# Patient Record
Sex: MALE | Race: WHITE | Employment: OTHER | URBAN - METROPOLITAN AREA
[De-identification: names, ages, dates, MRNs, and addresses within clinical notes are randomized per-mention and may not be internally consistent; named-entity substitution may affect disease eponyms.]

---

## 2017-01-04 ENCOUNTER — OFFICE VISIT (OUTPATIENT)
Dept: CARDIOLOGY CLINIC | Age: 77
End: 2017-01-04

## 2017-01-04 VITALS
SYSTOLIC BLOOD PRESSURE: 102 MMHG | OXYGEN SATURATION: 99 % | HEIGHT: 69 IN | HEART RATE: 68 BPM | DIASTOLIC BLOOD PRESSURE: 68 MMHG | WEIGHT: 194 LBS | BODY MASS INDEX: 28.73 KG/M2

## 2017-01-04 DIAGNOSIS — I48.0 PAF (PAROXYSMAL ATRIAL FIBRILLATION) (HCC): Primary | ICD-10-CM

## 2017-01-04 DIAGNOSIS — I10 ESSENTIAL HYPERTENSION: ICD-10-CM

## 2017-01-04 PROCEDURE — 99214 OFFICE O/P EST MOD 30 MIN: CPT | Performed by: INTERNAL MEDICINE

## 2017-01-04 PROCEDURE — 93000 ELECTROCARDIOGRAM COMPLETE: CPT | Performed by: INTERNAL MEDICINE

## 2017-01-04 RX ORDER — AMIODARONE HYDROCHLORIDE 200 MG/1
200 TABLET ORAL DAILY
Qty: 30 TABLET | Refills: 5 | Status: SHIPPED | OUTPATIENT
Start: 2017-01-04 | End: 2017-07-17 | Stop reason: SDUPTHER

## 2017-01-04 RX ORDER — POTASSIUM CHLORIDE 750 MG/1
10 TABLET, FILM COATED, EXTENDED RELEASE ORAL DAILY
Qty: 60 TABLET | Refills: 5 | Status: ON HOLD | OUTPATIENT
Start: 2017-01-04 | End: 2019-09-28

## 2017-01-04 RX ORDER — ATORVASTATIN CALCIUM 10 MG/1
10 TABLET, FILM COATED ORAL DAILY
Qty: 30 TABLET | Refills: 5 | Status: SHIPPED | OUTPATIENT
Start: 2017-01-04

## 2017-01-04 RX ORDER — LISINOPRIL AND HYDROCHLOROTHIAZIDE 12.5; 1 MG/1; MG/1
1 TABLET ORAL DAILY
Qty: 30 TABLET | Refills: 5 | Status: ON HOLD | OUTPATIENT
Start: 2017-01-04 | End: 2019-09-28 | Stop reason: HOSPADM

## 2017-07-18 RX ORDER — AMIODARONE HYDROCHLORIDE 200 MG/1
TABLET ORAL
Qty: 30 TABLET | Refills: 6 | Status: SHIPPED | OUTPATIENT
Start: 2017-07-18 | End: 2018-03-19 | Stop reason: SDUPTHER

## 2017-07-19 ENCOUNTER — OFFICE VISIT (OUTPATIENT)
Dept: CARDIOLOGY CLINIC | Age: 77
End: 2017-07-19

## 2017-07-19 VITALS
DIASTOLIC BLOOD PRESSURE: 56 MMHG | WEIGHT: 193 LBS | SYSTOLIC BLOOD PRESSURE: 116 MMHG | HEIGHT: 70 IN | BODY MASS INDEX: 27.63 KG/M2 | HEART RATE: 68 BPM | OXYGEN SATURATION: 97 %

## 2017-07-19 DIAGNOSIS — I48.91 ATRIAL FIBRILLATION, UNSPECIFIED TYPE (HCC): Primary | ICD-10-CM

## 2017-07-19 PROCEDURE — 99214 OFFICE O/P EST MOD 30 MIN: CPT | Performed by: INTERNAL MEDICINE

## 2017-07-19 PROCEDURE — 1036F TOBACCO NON-USER: CPT | Performed by: INTERNAL MEDICINE

## 2017-07-19 PROCEDURE — 4040F PNEUMOC VAC/ADMIN/RCVD: CPT | Performed by: INTERNAL MEDICINE

## 2017-07-19 PROCEDURE — G8419 CALC BMI OUT NRM PARAM NOF/U: HCPCS | Performed by: INTERNAL MEDICINE

## 2017-07-19 PROCEDURE — 1123F ACP DISCUSS/DSCN MKR DOCD: CPT | Performed by: INTERNAL MEDICINE

## 2017-07-19 PROCEDURE — 93000 ELECTROCARDIOGRAM COMPLETE: CPT | Performed by: INTERNAL MEDICINE

## 2017-07-19 PROCEDURE — G8427 DOCREV CUR MEDS BY ELIG CLIN: HCPCS | Performed by: INTERNAL MEDICINE

## 2018-01-25 RX ORDER — RIVAROXABAN 20 MG/1
TABLET, FILM COATED ORAL
Qty: 30 TABLET | Refills: 4 | Status: SHIPPED | OUTPATIENT
Start: 2018-01-25 | End: 2018-03-19 | Stop reason: SDUPTHER

## 2018-03-19 ENCOUNTER — OFFICE VISIT (OUTPATIENT)
Dept: CARDIOLOGY CLINIC | Age: 78
End: 2018-03-19

## 2018-03-19 VITALS
HEART RATE: 74 BPM | OXYGEN SATURATION: 98 % | SYSTOLIC BLOOD PRESSURE: 122 MMHG | HEIGHT: 69 IN | BODY MASS INDEX: 27.4 KG/M2 | WEIGHT: 185 LBS | DIASTOLIC BLOOD PRESSURE: 68 MMHG

## 2018-03-19 DIAGNOSIS — I10 ESSENTIAL HYPERTENSION: ICD-10-CM

## 2018-03-19 DIAGNOSIS — I48.91 ATRIAL FIBRILLATION, UNSPECIFIED TYPE (HCC): Primary | ICD-10-CM

## 2018-03-19 PROCEDURE — 93000 ELECTROCARDIOGRAM COMPLETE: CPT | Performed by: INTERNAL MEDICINE

## 2018-03-19 PROCEDURE — 99214 OFFICE O/P EST MOD 30 MIN: CPT | Performed by: INTERNAL MEDICINE

## 2018-03-19 RX ORDER — AMIODARONE HYDROCHLORIDE 200 MG/1
TABLET ORAL
Qty: 90 TABLET | Refills: 5 | Status: SHIPPED | OUTPATIENT
Start: 2018-03-19 | End: 2018-03-19 | Stop reason: SDUPTHER

## 2018-03-19 RX ORDER — AMIODARONE HYDROCHLORIDE 200 MG/1
200 TABLET ORAL
Qty: 90 TABLET | Refills: 5 | Status: SHIPPED | OUTPATIENT
Start: 2018-03-19 | End: 2018-10-24 | Stop reason: ALTCHOICE

## 2018-03-19 NOTE — PROGRESS NOTES
[address]  Cardiology Consult Note      Lima Hughes  1940, 68 y.o. Chief Complaint   Patient presents with    Atrial Fibrillation     HTN - 6 month follow up    Other     no cardiac complaints    Medication Refill     orders pending for Xarelto and amiodarone                 LYSSA DANIELS:      HPI:   This is a 68 y.o. male with a history of HTN and AF. Previously seen at [facility], [place] 11/22/16 with c/o \"heart fluttering. \"  He was found to be in AFIB with RVR upon arrival to ED. Converted himself without intervention. Sent home on Xarelto. Is now on amiodarone (started 7/2017). Patient is here today for f/u of AFIB and HTN. Has no complaints. Doing well.      Past Medical History:   Diagnosis Date    Atrial fibrillation ([address]     Back pain     [facility] ([facility] [facility]     prostate    Hyperlipidemia     Hypertension       Past Surgical History:   Procedure Laterality Date    BACK SURGERY      COLONOSCOPY  12/30/2016    Dr Angelic Owen        Family History   Problem Relation Age of Onset    Colon Cancer Brother       Social History   Substance Use Topics    Smoking status: Former Smoker     Years: 12.00     Quit date: 1986    Smokeless tobacco: Never Used    Alcohol use No     No Known Allergies      Review of Systems -   Constitutional: Negative for weight gain/loss; malaise, fever  Respiratory: Negative for Asthma;  cough and hemoptysis  Cardiovascular: Negative for palpitations,dizziness   Gastrointestinal: Negative for abd.pain; constipation/diarrhea;    Genitourinary: Negative for stones; hematuria; frequency hesitancy  Integumentt: Negative for rash or pruritis  Hematologic/lymphatic: Negative for blood dyscrasia; leukemia/lymphoma  Musculoskeletal: Negative for Connective tissue disease  Neurological:  Negative for Seizure   Behavioral/Psych:Negative for Bipolar disorder, Schizophrenia; Dementia  Endocrine: negative for thyroid, parathyroid disease    Physical Examination:    /68 (Site: Left Arm, Position: Sitting)   Pulse 74   Ht 5' 9\" (1.753 m)   Wt 185 lb (83.9 kg)   SpO2 98%   BMI 27.32 kg/m²    HEENT:  Face: Atraumatic, Conjunctiva: Pink; non icteric,  Mucous Memb:  Moist, No thyromegaly or Lymphadenopathy  Respiratory:  Resp Assessment: normal, Resp Auscultation: clear  Cardiovascular: Auscultation: nl S1 & S2, Palpation:  Nl PMI; No heaves or thrills, JVP:  normal  Abdomen: Soft, non-tender, Normal bowel sounds,  No organomegaly  Extremities: No Cyanosis or Clubbing  Neurological: Oriented to time, place, and person, Non-anxious  Psychiatric: Normal mood and affect  Skin: Warm and dry,  No rash seen     Outpatient Prescriptions Marked as Taking for the 3/19/18 encounter (Office Visit) with Srikanth Kuo MD   Medication Sig Dispense Refill    XARELTO 20 MG TABS tablet TAKE ONE TABLET BY MOUTH DAILY 30 tablet 4    amiodarone (CORDARONE) 200 MG tablet TAKE ONE TABLET BY MOUTH DAILY 30 tablet 6    lisinopril-hydrochlorothiazide (PRINZIDE;ZESTORETIC) 10-12.5 MG per tablet Take 1 tablet by mouth daily 30 tablet 5    atorvastatin (LIPITOR) 10 MG tablet Take 1 tablet by mouth daily 30 tablet 5    potassium chloride (KLOR-CON 10) 10 MEQ extended release tablet Take 1 tablet by mouth daily 60 tablet 5    vitamin D (ERGOCALCIFEROL) 00417 UNITS CAPS capsule Take 50,000 Units by mouth once a week      PARoxetine (PAXIL) 40 MG tablet Take 40 mg by mouth every morning      Oxycodone-Acetaminophen (PERCOCET PO) Take by mouth      Cholecalciferol (VITAMIN D PO) Take by mouth         EKG:   3/19/18 SR        ASSESSMENT AND PLAN:      Paroxysmal Atrial Fibrillation  In SR on Amio and xarelto  No balance issues, No shortness of breath. Will reduce Amiodarone to 5 days a week. Hypertension  BP is controlled. Continue risk factor modification including Prinzide and Low Na diet. Follow up in 6months.         Thank you very much for

## 2018-07-31 NOTE — TELEPHONE ENCOUNTER
PA started via Covermymeds. Xarelto not covered. Please advise if will Rx alternative Eliquis 5 mg. Pt last seen Dr. Dianna Menard on 03/19/18. history of HTN and AF. Previously seen at [facility] [place] 11/22/16 with c/o \"heart fluttering. \"     ASSESSMENT AND PLAN:   Paroxysmal Atrial Fibrillation  In SR on Amio and xarelto  No balance issues, No shortness of breath.   Will reduce Amiodarone to 5 days a week.

## 2018-09-19 ENCOUNTER — OFFICE VISIT (OUTPATIENT)
Dept: CARDIOLOGY CLINIC | Age: 78
End: 2018-09-19

## 2018-09-19 VITALS
WEIGHT: 184 LBS | HEART RATE: 69 BPM | SYSTOLIC BLOOD PRESSURE: 136 MMHG | DIASTOLIC BLOOD PRESSURE: 80 MMHG | OXYGEN SATURATION: 98 % | BODY MASS INDEX: 27.25 KG/M2 | HEIGHT: 69 IN

## 2018-09-19 DIAGNOSIS — I48.0 PAROXYSMAL ATRIAL FIBRILLATION (HCC): Primary | ICD-10-CM

## 2018-09-19 DIAGNOSIS — I10 ESSENTIAL HYPERTENSION: ICD-10-CM

## 2018-09-19 PROCEDURE — 99214 OFFICE O/P EST MOD 30 MIN: CPT | Performed by: INTERNAL MEDICINE

## 2018-09-19 PROCEDURE — 93000 ELECTROCARDIOGRAM COMPLETE: CPT | Performed by: INTERNAL MEDICINE

## 2018-09-19 NOTE — PATIENT INSTRUCTIONS
· You have symptoms of a stroke. These may include:  ¨ Sudden numbness, tingling, weakness, or loss of movement in your face, arm, or leg, especially on only one side of your body. ¨ Sudden vision changes. ¨ Sudden trouble speaking. ¨ Sudden confusion or trouble understanding simple statements. ¨ Sudden problems with walking or balance. ¨ A sudden, severe headache that is different from past headaches.     · You passed out (lost consciousness).    Call your doctor now or seek immediate medical care if:    · You have new or increased shortness of breath.     · You feel dizzy or lightheaded, or you feel like you may faint.     · Your heart rate becomes irregular.     · You can feel your heart flutter in your chest or skip heartbeats. Tell your doctor if these symptoms are new or worse.    Watch closely for changes in your health, and be sure to contact your doctor if you have any problems. Where can you learn more? Go to [email or web address] and sign in to your "[employer]" account. Enter U020 in the Audiam box to learn more about \"Atrial Fibrillation: Care Instructions. \"     If you do not have an account, please click on the \"Sign Up Now\" link. Current as of: December 6, 2017  Content Version: 11.7  © 5150-8848 "[employer]. Care instructions adapted under license by [facility] ([facility]). If you have questions about a medical condition or this instruction, always ask your healthcare professional. [place] [address] any warranty or liability for your use of this information.

## 2018-10-23 NOTE — PROGRESS NOTES
vitamin D (ERGOCALCIFEROL) 90492 UNITS CAPS capsule Take 50,000 Units by mouth once a week   Yes Historical Provider, MD   zolpidem (AMBIEN) 5 MG tablet Take 5 mg by mouth nightly as needed for Sleep   Yes Historical Provider, MD   PARoxetine (PAXIL) 40 MG tablet Take 40 mg by mouth every morning   Yes Historical Provider, MD   morphine sulfate 20 MG/ML concentrated oral solution Take 10 mg by mouth every 2 hours as needed for Pain   Yes Historical Provider, MD   LISINOPRIL PO Take 20 mg by mouth daily    Yes Historical Provider, MD   ATORVASTATIN CALCIUM PO Take by mouth   Yes Historical Provider, MD   Oxycodone-Acetaminophen (PERCOCET PO) Take by mouth   Yes Historical Provider, MD   Cholecalciferol (VITAMIN D PO) Take by mouth   Yes Historical Provider, MD       Social History:   reports that he quit smoking about 32 years ago. He quit after 12.00 years of use. He has never used smokeless tobacco. He reports that he does not drink alcohol or use drugs. Family History:  family history includes Colon Cancer in his brother. Reviewed.  Denies family history of sudden cardiac death, arrhythmia, premature CAD    Review of System:    · General ROS: negative for - chills, fever   · Psychological ROS: negative for - anxiety or depression  · Ophthalmic ROS: negative for - eye pain or loss of vision  · ENT ROS: negative for - epistaxis, headaches, nasal discharge, sore throat   · Allergy and Immunology ROS: negative for - hives, nasal congestion   · Hematological and Lymphatic ROS: negative for - bleeding problems, blood clots, bruising or jaundice  · Endocrine ROS: negative for - skin changes, temperature intolerance or unexpected weight changes  · Respiratory ROS: negative for - cough, hemoptysis, pleuritic pain, SOB, sputum changes or wheezing  · Cardiovascular ROS: Per HPI. +non sustained palpitations  · Gastrointestinal ROS: negative for - abdominal pain, blood in stools, diarrhea, hematemesis, melena, nausea/vomiting or swallowing difficulty/pain  · Genito-Urinary ROS: negative for - dysuria or incontinence  · Musculoskeletal ROS: negative for - joint swelling or muscle pain  · Neurological ROS: negative for - confusion, dizziness, gait disturbance, headaches, numbness/tingling, seizures,  speech problems, tremors, visual changes or weakness  · Dermatological ROS: negative for - rash    Physical Examination:  Vitals:    10/24/18 1339   BP: (!) 142/64   Pulse: 72   SpO2: 98%       · Constitutional: Oriented. No distress. · Head: Normocephalic and atraumatic. · Mouth/Throat: Oropharynx is clear and moist.   · Eyes: Conjunctivae normal. EOM are normal.   · Neck: Normal range of motion. Neck supple. No rigidity. No JVD present. · Cardiovascular: Normal rate, regular rhythm, S1&S2 and intact distal pulses. · Pulmonary/Chest: Bilateral respiratory sounds. No wheezes. No rhonchi. · Abdominal: Soft. Bowel sounds present. No distension, No tenderness. · Musculoskeletal: No tenderness. No edema    · Lymphadenopathy: Has no cervical adenopathy. · Neurological: Alert and oriented. Cranial nerve appears intact, No Gross deficit   · Skin: Skin is warm and dry. No rash noted. · Psychiatric: Has a normal mood, affect and behavior     Labs:  Reviewed. ECG: reviewed, Sinus  Rhythm   -Old anteroseptal infarct.   -Nonspecific T-abnormality. Studies:     1. 48 Hour Holter Monitor: 4/2014 (in3Depth)  Normal sinus rhythm with an excess of premature ventricular beats, some of which the  patient noted as irregular heart beat. There are several short runs of atrial  tachycardia for which the patient was asymptomatic. This is an abnormal Holter. Clinical correlation required. 2. Echo:   None    3. Stress Test:  6/2009 (in3Depth)  No evidence of ischemia, normal myocardial perfusion study. 4. Cath:   None     Procedures:  1.  Successful DCC 2016    Assessment:   There are no active problems to display were discussed with the patient. The risks including, but not limited to, the risks of bleeding, infection, radiation exposure, injury to vascular, cardiac and surrounding structures (including pneumothorax), stroke, cardiac perforation, tamponade, need for emergent open heart surgery, need for pacemaker implantation, injury to the phrenic nerve, injury to the esophagus, myocardial infarction and death were discussed in detail. The patient opted to proceed with the ablation. Will schedule for RF ablation with Carto Navigation system. Cardiac CTA prior to procedure for pulmonary vein mapping. Hold Eliquis for 2 days prior to procedure. The patient is no longer on Amiodarone and we will not continue it. Will order BMP, CBC, PT/INR, and Type & Screen prior to the procedure. After the ablation is complete, we will start Flecainide 50mg BID, along with metoprolol 12.5mg BID. Thank you for allowing me to participate in the care of Giancarlo Dominique. All questions and concerns were addressed to the patient/family. Alternatives to my treatment were discussed. This note was scribed in the presence of Dr. Caridad Estrada MD by Bushra Sidhu RN. The scribe's documentation has been prepared under my direction and personally reviewed by me in its entirety. I confirm that the note above accurately reflects all work, physical examination, the discussion of treatments and procedures, and medical decision making performed by me.       Jocelyn Rueda MD, [address], [facility], [facility]  Cardiac Electrophysiology  [address] [facility]   Office: [phone]

## 2018-10-24 ENCOUNTER — OFFICE VISIT (OUTPATIENT)
Dept: CARDIOLOGY CLINIC | Age: 78
End: 2018-10-24
Payer: MEDICARE

## 2018-10-24 VITALS
HEIGHT: 70 IN | DIASTOLIC BLOOD PRESSURE: 64 MMHG | OXYGEN SATURATION: 98 % | SYSTOLIC BLOOD PRESSURE: 142 MMHG | BODY MASS INDEX: 26.34 KG/M2 | HEART RATE: 72 BPM | WEIGHT: 184 LBS

## 2018-10-24 DIAGNOSIS — I48.19 PERSISTENT ATRIAL FIBRILLATION (HCC): Primary | ICD-10-CM

## 2018-10-24 DIAGNOSIS — Z71.89 ENCOUNTER TO DISCUSS PROCEDURE: ICD-10-CM

## 2018-10-24 DIAGNOSIS — Z79.899 ON AMIODARONE THERAPY: ICD-10-CM

## 2018-10-24 PROCEDURE — 93000 ELECTROCARDIOGRAM COMPLETE: CPT | Performed by: INTERNAL MEDICINE

## 2018-10-24 PROCEDURE — 99205 OFFICE O/P NEW HI 60 MIN: CPT | Performed by: INTERNAL MEDICINE

## 2018-10-24 NOTE — LETTER
vitamin D (ERGOCALCIFEROL) 10398 UNITS CAPS capsule Take 50,000 Units by mouth once a week   Yes Historical Provider, MD   zolpidem (AMBIEN) 5 MG tablet Take 5 mg by mouth nightly as needed for Sleep   Yes Historical Provider, MD   PARoxetine (PAXIL) 40 MG tablet Take 40 mg by mouth every morning   Yes Historical Provider, MD   morphine sulfate 20 MG/ML concentrated oral solution Take 10 mg by mouth every 2 hours as needed for Pain   Yes Historical Provider, MD   LISINOPRIL PO Take 20 mg by mouth daily    Yes Historical Provider, MD   ATORVASTATIN CALCIUM PO Take by mouth   Yes Historical Provider, MD   Oxycodone-Acetaminophen (PERCOCET PO) Take by mouth   Yes Historical Provider, MD   Cholecalciferol (VITAMIN D PO) Take by mouth   Yes Historical Provider, MD       Social History:   reports that he quit smoking about 32 years ago. He quit after 12.00 years of use. He has never used smokeless tobacco. He reports that he does not drink alcohol or use drugs. Family History:  family history includes Colon Cancer in his brother. Reviewed.  Denies family history of sudden cardiac death, arrhythmia, premature CAD    Review of System:    · General ROS: negative for - chills, fever   · Psychological ROS: negative for - anxiety or depression  · Ophthalmic ROS: negative for - eye pain or loss of vision  · ENT ROS: negative for - epistaxis, headaches, nasal discharge, sore throat   · Allergy and Immunology ROS: negative for - hives, nasal congestion   · Hematological and Lymphatic ROS: negative for - bleeding problems, blood clots, bruising or jaundice  · Endocrine ROS: negative for - skin changes, temperature intolerance or unexpected weight changes  · Respiratory ROS: negative for - cough, hemoptysis, pleuritic pain, SOB, sputum changes or wheezing  · Cardiovascular ROS: Per HPI. +non sustained palpitations  · Gastrointestinal ROS: negative for - abdominal pain, blood in stools,

## 2018-11-02 ENCOUNTER — HOSPITAL ENCOUNTER (OUTPATIENT)
Dept: NON INVASIVE DIAGNOSTICS | Age: 78
Discharge: HOME | End: 2018-11-02
Payer: MEDICARE

## 2018-11-02 DIAGNOSIS — I48.19 PERSISTENT ATRIAL FIBRILLATION (HCC): ICD-10-CM

## 2018-11-02 LAB
LV EF: 60 %
LVEF MODALITY: NORMAL

## 2018-11-02 PROCEDURE — 93306 TTE W/DOPPLER COMPLETE: CPT

## 2018-12-26 ENCOUNTER — TELEPHONE (OUTPATIENT)
Dept: CARDIOLOGY CLINIC | Age: 78
End: 2018-12-26

## 2018-12-26 ENCOUNTER — NURSE ONLY (OUTPATIENT)
Dept: CARDIOLOGY CLINIC | Age: 78
End: 2018-12-26
Payer: MEDICARE

## 2018-12-26 DIAGNOSIS — I48.0 PAROXYSMAL ATRIAL FIBRILLATION (HCC): Primary | ICD-10-CM

## 2018-12-26 PROCEDURE — 93000 ELECTROCARDIOGRAM COMPLETE: CPT | Performed by: INTERNAL MEDICINE

## 2018-12-26 NOTE — TELEPHONE ENCOUNTER
Patient called stating that he is in need of something to replace the amiodarone. Said that he is not in afib but his heart was beating fast. He said that he spoke about this in his last appt. He is currently waiting on his date for his upcoming procedure.      Patient can be reached at [phone] (N)

## 2018-12-26 NOTE — TELEPHONE ENCOUNTER
Patient stopped in for the EKG. It was normal.  Dr. Kuldeep Patel reviewed- no new orders on starting a medication. An event monitor was offered in order to start a medication but he deferred. He feels that it is fine and episodes are infrequent. He is questioning when the ablation will be but then a minute later he states that he \"doesn't know if it is really needed. \" For now, we are leaving him on the list and when something opens he can defer at that time if he still requests. He verbalized understanding.

## 2019-01-08 RX ORDER — APIXABAN 5 MG/1
TABLET, FILM COATED ORAL
Qty: 60 TABLET | Refills: 5 | Status: SHIPPED | OUTPATIENT
Start: 2019-01-08 | End: 2019-08-13 | Stop reason: SDUPTHER

## 2019-02-05 ENCOUNTER — TELEPHONE (OUTPATIENT)
Dept: CARDIOLOGY CLINIC | Age: 79
End: 2019-02-05

## 2019-02-12 ENCOUNTER — TELEPHONE (OUTPATIENT)
Dept: CARDIOLOGY CLINIC | Age: 79
End: 2019-02-12

## 2019-02-14 ENCOUNTER — HOSPITAL ENCOUNTER (OUTPATIENT)
Dept: CT IMAGING | Age: 79
Discharge: HOME | End: 2019-02-14
Payer: MEDICARE

## 2019-02-14 DIAGNOSIS — I48.19 PERSISTENT ATRIAL FIBRILLATION (HCC): ICD-10-CM

## 2019-02-14 LAB
ABO/RH: NORMAL
ANION GAP SERPL CALCULATED.3IONS-SCNC: 8 MMOL/L (ref 3–16)
ANTIBODY SCREEN: NORMAL
BUN BLDV-MCNC: 8 MG/DL (ref 7–20)
CALCIUM SERPL-MCNC: 9.4 MG/DL (ref 8.3–10.6)
CHLORIDE BLD-SCNC: 90 MMOL/L (ref 99–110)
CO2: 28 MMOL/L (ref 21–32)
CREAT SERPL-MCNC: 1 MG/DL (ref 0.8–1.3)
GFR AFRICAN AMERICAN: >60
GFR NON-AFRICAN AMERICAN: >60
GLUCOSE BLD-MCNC: 102 MG/DL (ref 70–99)
HCT VFR BLD CALC: 36.9 % (ref 40.5–52.5)
HEMOGLOBIN: 12.7 G/DL (ref 13.5–17.5)
INR BLD: 1.39 (ref 0.86–1.14)
MCH RBC QN AUTO: 35 PG (ref 26–34)
MCHC RBC AUTO-ENTMCNC: 34.4 G/DL (ref 31–36)
MCV RBC AUTO: 101.9 FL (ref 80–100)
PDW BLD-RTO: 12.4 % (ref 12.4–15.4)
PLATELET # BLD: 189 K/UL (ref 135–450)
PMV BLD AUTO: 7.6 FL (ref 5–10.5)
POTASSIUM SERPL-SCNC: 4.1 MMOL/L (ref 3.5–5.1)
PROTHROMBIN TIME: 15.9 SEC (ref 9.8–13)
RBC # BLD: 3.62 M/UL (ref 4.2–5.9)
SODIUM BLD-SCNC: 126 MMOL/L (ref 136–145)
WBC # BLD: 4 K/UL (ref 4–11)

## 2019-02-14 PROCEDURE — 86900 BLOOD TYPING SEROLOGIC ABO: CPT

## 2019-02-14 PROCEDURE — 85610 PROTHROMBIN TIME: CPT

## 2019-02-14 PROCEDURE — 6360000004 HC RX CONTRAST MEDICATION: Performed by: INTERNAL MEDICINE

## 2019-02-14 PROCEDURE — 86850 RBC ANTIBODY SCREEN: CPT

## 2019-02-14 PROCEDURE — 75574 CT ANGIO HRT W/3D IMAGE: CPT

## 2019-02-14 PROCEDURE — 36415 COLL VENOUS BLD VENIPUNCTURE: CPT

## 2019-02-14 PROCEDURE — 80048 BASIC METABOLIC PNL TOTAL CA: CPT

## 2019-02-14 PROCEDURE — 85027 COMPLETE CBC AUTOMATED: CPT

## 2019-02-14 PROCEDURE — 86901 BLOOD TYPING SEROLOGIC RH(D): CPT

## 2019-02-14 RX ADMIN — IOPAMIDOL 75 ML: 755 INJECTION, SOLUTION INTRAVENOUS at 08:53

## 2019-02-19 ENCOUNTER — ANESTHESIA EVENT (OUTPATIENT)
Dept: CARDIAC CATH/INVASIVE PROCEDURES | Age: 79
DRG: 274 | End: 2019-02-19
Payer: MEDICARE

## 2019-02-20 ENCOUNTER — HOSPITAL ENCOUNTER (INPATIENT)
Dept: CARDIAC CATH/INVASIVE PROCEDURES | Age: 79
LOS: 1 days | Discharge: HOME | DRG: 274 | End: 2019-02-21
Attending: INTERNAL MEDICINE | Admitting: INTERNAL MEDICINE
Payer: MEDICARE

## 2019-02-20 ENCOUNTER — ANESTHESIA (OUTPATIENT)
Dept: CARDIAC CATH/INVASIVE PROCEDURES | Age: 79
DRG: 274 | End: 2019-02-20
Payer: MEDICARE

## 2019-02-20 VITALS
SYSTOLIC BLOOD PRESSURE: 121 MMHG | TEMPERATURE: 97.3 F | RESPIRATION RATE: 21 BRPM | OXYGEN SATURATION: 91 % | DIASTOLIC BLOOD PRESSURE: 73 MMHG

## 2019-02-20 PROBLEM — I48.91 A-FIB (HCC): Status: ACTIVE | Noted: 2019-02-20

## 2019-02-20 PROBLEM — I48.0 PAROXYSMAL ATRIAL FIBRILLATION (HCC): Status: ACTIVE | Noted: 2019-02-20

## 2019-02-20 LAB
ACTIVATED CLOTTING TIME: 329 SEC (ref 99–130)
ACTIVATED CLOTTING TIME: 368 SEC (ref 99–130)
CALCIUM IONIZED: 1.18 MMOL/L (ref 1.12–1.32)
EKG ATRIAL RATE: 62 BPM
EKG ATRIAL RATE: 86 BPM
EKG DIAGNOSIS: NORMAL
EKG DIAGNOSIS: NORMAL
EKG P AXIS: -3 DEGREES
EKG P AXIS: 2 DEGREES
EKG P-R INTERVAL: 170 MS
EKG P-R INTERVAL: 186 MS
EKG Q-T INTERVAL: 396 MS
EKG Q-T INTERVAL: 440 MS
EKG QRS DURATION: 78 MS
EKG QRS DURATION: 92 MS
EKG QTC CALCULATION (BAZETT): 446 MS
EKG QTC CALCULATION (BAZETT): 473 MS
EKG R AXIS: -34 DEGREES
EKG R AXIS: -35 DEGREES
EKG T AXIS: -18 DEGREES
EKG T AXIS: -30 DEGREES
EKG VENTRICULAR RATE: 62 BPM
EKG VENTRICULAR RATE: 86 BPM
GFR AFRICAN AMERICAN: >60
GFR NON-AFRICAN AMERICAN: >60
GLUCOSE BLD-MCNC: 101 MG/DL (ref 70–99)
PERFORMED ON: ABNORMAL
POC ACT LR: 337 SEC
POC ACT LR: 348 SEC
POC ACT LR: 356 SEC
POC ACT LR: >400 SEC
POC CHLORIDE: 96 MMOL/L (ref 99–110)
POC CREATININE: 1 MG/DL (ref 0.8–1.3)
POC POTASSIUM: 3.5 MMOL/L (ref 3.5–5.1)
POC SAMPLE TYPE: ABNORMAL
POC SODIUM: 135 MMOL/L (ref 136–145)

## 2019-02-20 PROCEDURE — 2580000003 HC RX 258

## 2019-02-20 PROCEDURE — C1894 INTRO/SHEATH, NON-LASER: HCPCS

## 2019-02-20 PROCEDURE — 82435 ASSAY OF BLOOD CHLORIDE: CPT

## 2019-02-20 PROCEDURE — 85347 COAGULATION TIME ACTIVATED: CPT

## 2019-02-20 PROCEDURE — 94762 N-INVAS EAR/PLS OXIMTRY CONT: CPT

## 2019-02-20 PROCEDURE — 2720000010 HC SURG SUPPLY STERILE

## 2019-02-20 PROCEDURE — 6360000002 HC RX W HCPCS: Performed by: NURSE ANESTHETIST, CERTIFIED REGISTERED

## 2019-02-20 PROCEDURE — 7100000001 HC PACU RECOVERY - ADDTL 15 MIN

## 2019-02-20 PROCEDURE — 2500000003 HC RX 250 WO HCPCS: Performed by: NURSE ANESTHETIST, CERTIFIED REGISTERED

## 2019-02-20 PROCEDURE — 93010 ELECTROCARDIOGRAM REPORT: CPT | Performed by: INTERNAL MEDICINE

## 2019-02-20 PROCEDURE — 93613 INTRACARDIAC EPHYS 3D MAPG: CPT | Performed by: FAMILY MEDICINE

## 2019-02-20 PROCEDURE — 93005 ELECTROCARDIOGRAM TRACING: CPT | Performed by: INTERNAL MEDICINE

## 2019-02-20 PROCEDURE — 93656 COMPRE EP EVAL ABLTJ ATR FIB: CPT | Performed by: INTERNAL MEDICINE

## 2019-02-20 PROCEDURE — 6370000000 HC RX 637 (ALT 250 FOR IP): Performed by: INTERNAL MEDICINE

## 2019-02-20 PROCEDURE — 93613 INTRACARDIAC EPHYS 3D MAPG: CPT | Performed by: INTERNAL MEDICINE

## 2019-02-20 PROCEDURE — 2580000003 HC RX 258: Performed by: ANESTHESIOLOGY

## 2019-02-20 PROCEDURE — 2100000000 HC CCU R&B

## 2019-02-20 PROCEDURE — 02583ZZ DESTRUCTION OF CONDUCTION MECHANISM, PERCUTANEOUS APPROACH: ICD-10-PCS | Performed by: INTERNAL MEDICINE

## 2019-02-20 PROCEDURE — C1732 CATH, EP, DIAG/ABL, 3D/VECT: HCPCS

## 2019-02-20 PROCEDURE — 93656 COMPRE EP EVAL ABLTJ ATR FIB: CPT | Performed by: FAMILY MEDICINE

## 2019-02-20 PROCEDURE — 84295 ASSAY OF SERUM SODIUM: CPT

## 2019-02-20 PROCEDURE — 84132 ASSAY OF SERUM POTASSIUM: CPT

## 2019-02-20 PROCEDURE — C1730 CATH, EP, 19 OR FEW ELECT: HCPCS

## 2019-02-20 PROCEDURE — 7100000000 HC PACU RECOVERY - FIRST 15 MIN

## 2019-02-20 PROCEDURE — 82330 ASSAY OF CALCIUM: CPT

## 2019-02-20 PROCEDURE — 93623 PRGRMD STIMJ&PACG IV RX NFS: CPT | Performed by: FAMILY MEDICINE

## 2019-02-20 PROCEDURE — 3700000000 HC ANESTHESIA ATTENDED CARE

## 2019-02-20 PROCEDURE — G0378 HOSPITAL OBSERVATION PER HR: HCPCS

## 2019-02-20 PROCEDURE — 4A0234Z MEASUREMENT OF CARDIAC ELECTRICAL ACTIVITY, PERCUTANEOUS APPROACH: ICD-10-PCS | Performed by: INTERNAL MEDICINE

## 2019-02-20 PROCEDURE — C1759 CATH, INTRA ECHOCARDIOGRAPHY: HCPCS

## 2019-02-20 PROCEDURE — 02K83ZZ MAP CONDUCTION MECHANISM, PERCUTANEOUS APPROACH: ICD-10-PCS | Performed by: INTERNAL MEDICINE

## 2019-02-20 PROCEDURE — 2500000003 HC RX 250 WO HCPCS

## 2019-02-20 PROCEDURE — 82947 ASSAY GLUCOSE BLOOD QUANT: CPT

## 2019-02-20 PROCEDURE — 82565 ASSAY OF CREATININE: CPT

## 2019-02-20 PROCEDURE — 93662 INTRACARDIAC ECG (ICE): CPT | Performed by: INTERNAL MEDICINE

## 2019-02-20 PROCEDURE — 93662 INTRACARDIAC ECG (ICE): CPT | Performed by: FAMILY MEDICINE

## 2019-02-20 PROCEDURE — 3700000001 HC ADD 15 MINUTES (ANESTHESIA)

## 2019-02-20 PROCEDURE — 93623 PRGRMD STIMJ&PACG IV RX NFS: CPT | Performed by: INTERNAL MEDICINE

## 2019-02-20 PROCEDURE — 6360000002 HC RX W HCPCS

## 2019-02-20 RX ORDER — FUROSEMIDE 10 MG/ML
INJECTION INTRAMUSCULAR; INTRAVENOUS PRN
Status: DISCONTINUED | OUTPATIENT
Start: 2019-02-20 | End: 2019-02-20 | Stop reason: SDUPTHER

## 2019-02-20 RX ORDER — SODIUM CHLORIDE 9 MG/ML
INJECTION, SOLUTION INTRAVENOUS CONTINUOUS
Status: DISCONTINUED | OUTPATIENT
Start: 2019-02-20 | End: 2019-02-21 | Stop reason: HOSPADM

## 2019-02-20 RX ORDER — FENTANYL CITRATE 50 UG/ML
25 INJECTION, SOLUTION INTRAMUSCULAR; INTRAVENOUS EVERY 5 MIN PRN
Status: DISCONTINUED | OUTPATIENT
Start: 2019-02-20 | End: 2019-02-20

## 2019-02-20 RX ORDER — HEPARIN SODIUM 1000 [USP'U]/ML
INJECTION, SOLUTION INTRAVENOUS; SUBCUTANEOUS PRN
Status: DISCONTINUED | OUTPATIENT
Start: 2019-02-20 | End: 2019-02-20 | Stop reason: SDUPTHER

## 2019-02-20 RX ORDER — MORPHINE SULFATE 2 MG/ML
2 INJECTION, SOLUTION INTRAMUSCULAR; INTRAVENOUS EVERY 4 HOURS PRN
Status: DISCONTINUED | OUTPATIENT
Start: 2019-02-20 | End: 2019-02-21 | Stop reason: HOSPADM

## 2019-02-20 RX ORDER — SODIUM CHLORIDE 0.9 % (FLUSH) 0.9 %
10 SYRINGE (ML) INJECTION PRN
Status: DISCONTINUED | OUTPATIENT
Start: 2019-02-20 | End: 2019-02-20 | Stop reason: SDUPTHER

## 2019-02-20 RX ORDER — FENTANYL CITRATE 50 UG/ML
INJECTION, SOLUTION INTRAMUSCULAR; INTRAVENOUS PRN
Status: DISCONTINUED | OUTPATIENT
Start: 2019-02-20 | End: 2019-02-20 | Stop reason: SDUPTHER

## 2019-02-20 RX ORDER — SUCCINYLCHOLINE CHLORIDE 20 MG/ML
INJECTION INTRAMUSCULAR; INTRAVENOUS PRN
Status: DISCONTINUED | OUTPATIENT
Start: 2019-02-20 | End: 2019-02-20 | Stop reason: SDUPTHER

## 2019-02-20 RX ORDER — FLECAINIDE ACETATE 100 MG/1
50 TABLET ORAL EVERY 12 HOURS SCHEDULED
Status: DISCONTINUED | OUTPATIENT
Start: 2019-02-20 | End: 2019-02-21 | Stop reason: HOSPADM

## 2019-02-20 RX ORDER — SODIUM CHLORIDE 0.9 % (FLUSH) 0.9 %
10 SYRINGE (ML) INJECTION EVERY 12 HOURS SCHEDULED
Status: DISCONTINUED | OUTPATIENT
Start: 2019-02-20 | End: 2019-02-20 | Stop reason: SDUPTHER

## 2019-02-20 RX ORDER — GLYCOPYRROLATE 0.2 MG/ML
INJECTION INTRAMUSCULAR; INTRAVENOUS PRN
Status: DISCONTINUED | OUTPATIENT
Start: 2019-02-20 | End: 2019-02-20 | Stop reason: SDUPTHER

## 2019-02-20 RX ORDER — ONDANSETRON 2 MG/ML
INJECTION INTRAMUSCULAR; INTRAVENOUS PRN
Status: DISCONTINUED | OUTPATIENT
Start: 2019-02-20 | End: 2019-02-20 | Stop reason: SDUPTHER

## 2019-02-20 RX ORDER — LABETALOL HYDROCHLORIDE 5 MG/ML
5 INJECTION, SOLUTION INTRAVENOUS EVERY 10 MIN PRN
Status: DISCONTINUED | OUTPATIENT
Start: 2019-02-20 | End: 2019-02-20

## 2019-02-20 RX ORDER — ZOLPIDEM TARTRATE 5 MG/1
5 TABLET ORAL NIGHTLY PRN
Status: DISCONTINUED | OUTPATIENT
Start: 2019-02-20 | End: 2019-02-21 | Stop reason: HOSPADM

## 2019-02-20 RX ORDER — SODIUM CHLORIDE 9 MG/ML
INJECTION, SOLUTION INTRAVENOUS CONTINUOUS
Status: DISCONTINUED | OUTPATIENT
Start: 2019-02-20 | End: 2019-02-20 | Stop reason: SDUPTHER

## 2019-02-20 RX ORDER — KETAMINE HCL IN NACL, ISO-OSM 100MG/10ML
SYRINGE (ML) INJECTION PRN
Status: DISCONTINUED | OUTPATIENT
Start: 2019-02-20 | End: 2019-02-20 | Stop reason: SDUPTHER

## 2019-02-20 RX ORDER — ATORVASTATIN CALCIUM 10 MG/1
10 TABLET, FILM COATED ORAL DAILY
Status: DISCONTINUED | OUTPATIENT
Start: 2019-02-21 | End: 2019-02-21 | Stop reason: HOSPADM

## 2019-02-20 RX ORDER — PAROXETINE 10 MG/1
30 TABLET, FILM COATED ORAL DAILY
Status: DISCONTINUED | OUTPATIENT
Start: 2019-02-21 | End: 2019-02-21 | Stop reason: HOSPADM

## 2019-02-20 RX ORDER — LIDOCAINE HYDROCHLORIDE 10 MG/ML
INJECTION, SOLUTION INFILTRATION; PERINEURAL PRN
Status: DISCONTINUED | OUTPATIENT
Start: 2019-02-20 | End: 2019-02-20 | Stop reason: SDUPTHER

## 2019-02-20 RX ORDER — LISINOPRIL AND HYDROCHLOROTHIAZIDE 12.5; 1 MG/1; MG/1
1 TABLET ORAL DAILY
Status: DISCONTINUED | OUTPATIENT
Start: 2019-02-21 | End: 2019-02-21 | Stop reason: HOSPADM

## 2019-02-20 RX ORDER — HEPARIN SODIUM 10000 [USP'U]/100ML
INJECTION, SOLUTION INTRAVENOUS CONTINUOUS PRN
Status: DISCONTINUED | OUTPATIENT
Start: 2019-02-20 | End: 2019-02-20 | Stop reason: SDUPTHER

## 2019-02-20 RX ORDER — SODIUM CHLORIDE 0.9 % (FLUSH) 0.9 %
10 SYRINGE (ML) INJECTION PRN
Status: DISCONTINUED | OUTPATIENT
Start: 2019-02-20 | End: 2019-02-21 | Stop reason: HOSPADM

## 2019-02-20 RX ORDER — ACETAMINOPHEN 325 MG/1
650 TABLET ORAL EVERY 4 HOURS PRN
Status: DISCONTINUED | OUTPATIENT
Start: 2019-02-20 | End: 2019-02-21 | Stop reason: HOSPADM

## 2019-02-20 RX ORDER — PAROXETINE 10 MG/1
10 TABLET, FILM COATED ORAL DAILY
Status: DISCONTINUED | OUTPATIENT
Start: 2019-03-06 | End: 2019-02-21 | Stop reason: HOSPADM

## 2019-02-20 RX ORDER — PROPOFOL 10 MG/ML
INJECTION, EMULSION INTRAVENOUS PRN
Status: DISCONTINUED | OUTPATIENT
Start: 2019-02-20 | End: 2019-02-20 | Stop reason: SDUPTHER

## 2019-02-20 RX ORDER — PAROXETINE 10 MG/1
40 TABLET, FILM COATED ORAL EVERY MORNING
Status: DISCONTINUED | OUTPATIENT
Start: 2019-02-20 | End: 2019-02-20

## 2019-02-20 RX ORDER — PROMETHAZINE HYDROCHLORIDE 25 MG/ML
6.25 INJECTION, SOLUTION INTRAMUSCULAR; INTRAVENOUS
Status: DISCONTINUED | OUTPATIENT
Start: 2019-02-20 | End: 2019-02-20

## 2019-02-20 RX ORDER — VECURONIUM BROMIDE 1 MG/ML
INJECTION, POWDER, LYOPHILIZED, FOR SOLUTION INTRAVENOUS PRN
Status: DISCONTINUED | OUTPATIENT
Start: 2019-02-20 | End: 2019-02-20 | Stop reason: SDUPTHER

## 2019-02-20 RX ORDER — EPHEDRINE SULFATE/0.9% NACL/PF 50 MG/5 ML
SYRINGE (ML) INTRAVENOUS PRN
Status: DISCONTINUED | OUTPATIENT
Start: 2019-02-20 | End: 2019-02-20 | Stop reason: SDUPTHER

## 2019-02-20 RX ORDER — SODIUM CHLORIDE 0.9 % (FLUSH) 0.9 %
10 SYRINGE (ML) INJECTION EVERY 12 HOURS SCHEDULED
Status: DISCONTINUED | OUTPATIENT
Start: 2019-02-20 | End: 2019-02-21 | Stop reason: HOSPADM

## 2019-02-20 RX ORDER — PAROXETINE 10 MG/1
20 TABLET, FILM COATED ORAL DAILY
Status: DISCONTINUED | OUTPATIENT
Start: 2019-02-27 | End: 2019-02-21 | Stop reason: HOSPADM

## 2019-02-20 RX ORDER — DEXAMETHASONE SODIUM PHOSPHATE 4 MG/ML
INJECTION, SOLUTION INTRA-ARTICULAR; INTRALESIONAL; INTRAMUSCULAR; INTRAVENOUS; SOFT TISSUE PRN
Status: DISCONTINUED | OUTPATIENT
Start: 2019-02-20 | End: 2019-02-20 | Stop reason: SDUPTHER

## 2019-02-20 RX ADMIN — GLYCOPYRROLATE 0.2 MG: 0.2 INJECTION, SOLUTION INTRAMUSCULAR; INTRAVENOUS at 08:39

## 2019-02-20 RX ADMIN — FUROSEMIDE 20 MG: 10 INJECTION, SOLUTION INTRAMUSCULAR; INTRAVENOUS at 11:49

## 2019-02-20 RX ADMIN — DEXAMETHASONE SODIUM PHOSPHATE 8 MG: 4 INJECTION, SOLUTION INTRAMUSCULAR; INTRAVENOUS at 08:39

## 2019-02-20 RX ADMIN — Medication 10 MG: at 09:25

## 2019-02-20 RX ADMIN — METOPROLOL TARTRATE 12.5 MG: 25 TABLET ORAL at 20:14

## 2019-02-20 RX ADMIN — Medication 30 MG: at 08:34

## 2019-02-20 RX ADMIN — FENTANYL CITRATE 50 MCG: 50 INJECTION INTRAMUSCULAR; INTRAVENOUS at 11:35

## 2019-02-20 RX ADMIN — SUCCINYLCHOLINE CHLORIDE 120 MG: 20 INJECTION, SOLUTION INTRAMUSCULAR; INTRAVENOUS at 08:34

## 2019-02-20 RX ADMIN — FLECAINIDE ACETATE 50 MG: 100 TABLET ORAL at 20:14

## 2019-02-20 RX ADMIN — HEPARIN SODIUM 5000 UNITS: 1000 INJECTION, SOLUTION INTRAVENOUS; SUBCUTANEOUS at 10:36

## 2019-02-20 RX ADMIN — Medication 10 MG: at 08:58

## 2019-02-20 RX ADMIN — APIXABAN 5 MG: 5 TABLET, FILM COATED ORAL at 14:56

## 2019-02-20 RX ADMIN — Medication 10 ML: at 20:15

## 2019-02-20 RX ADMIN — VECURONIUM BROMIDE 5 MG: 1 INJECTION, POWDER, LYOPHILIZED, FOR SOLUTION INTRAVENOUS at 10:58

## 2019-02-20 RX ADMIN — FENTANYL CITRATE 50 MCG: 50 INJECTION INTRAMUSCULAR; INTRAVENOUS at 11:45

## 2019-02-20 RX ADMIN — SODIUM CHLORIDE: 9 INJECTION, SOLUTION INTRAVENOUS at 08:27

## 2019-02-20 RX ADMIN — FENTANYL CITRATE 100 MCG: 50 INJECTION INTRAMUSCULAR; INTRAVENOUS at 08:34

## 2019-02-20 RX ADMIN — Medication 10 MG: at 09:00

## 2019-02-20 RX ADMIN — SUGAMMADEX 200 MG: 100 INJECTION, SOLUTION INTRAVENOUS at 12:37

## 2019-02-20 RX ADMIN — LIDOCAINE HYDROCHLORIDE 50 MG: 10 INJECTION, SOLUTION INFILTRATION; PERINEURAL at 08:34

## 2019-02-20 RX ADMIN — PROPOFOL 50 MG: 10 INJECTION, EMULSION INTRAVENOUS at 08:34

## 2019-02-20 RX ADMIN — HEPARIN SODIUM AND DEXTROSE 8000 UNITS/HR: 10000; 5 INJECTION INTRAVENOUS at 11:00

## 2019-02-20 RX ADMIN — VECURONIUM BROMIDE 5 MG: 1 INJECTION, POWDER, LYOPHILIZED, FOR SOLUTION INTRAVENOUS at 10:04

## 2019-02-20 RX ADMIN — Medication 10 MG: at 08:57

## 2019-02-20 RX ADMIN — ONDANSETRON 4 MG: 2 INJECTION INTRAMUSCULAR; INTRAVENOUS at 08:39

## 2019-02-20 RX ADMIN — HEPARIN SODIUM 5000 UNITS: 1000 INJECTION, SOLUTION INTRAVENOUS; SUBCUTANEOUS at 10:41

## 2019-02-20 RX ADMIN — PROPOFOL 50 MG: 10 INJECTION, EMULSION INTRAVENOUS at 08:38

## 2019-02-20 RX ADMIN — VECURONIUM BROMIDE 10 MG: 1 INJECTION, POWDER, LYOPHILIZED, FOR SOLUTION INTRAVENOUS at 08:38

## 2019-02-20 RX ADMIN — Medication 10 MG: at 09:15

## 2019-02-20 ASSESSMENT — PULMONARY FUNCTION TESTS
PIF_VALUE: 17
PIF_VALUE: 14
PIF_VALUE: 16
PIF_VALUE: 18
PIF_VALUE: 17
PIF_VALUE: 16
PIF_VALUE: 16
PIF_VALUE: 17
PIF_VALUE: 1
PIF_VALUE: 17
PIF_VALUE: 16
PIF_VALUE: 16
PIF_VALUE: 17
PIF_VALUE: 13
PIF_VALUE: 16
PIF_VALUE: 17
PIF_VALUE: 18
PIF_VALUE: 15
PIF_VALUE: 17
PIF_VALUE: 15
PIF_VALUE: 16
PIF_VALUE: 18
PIF_VALUE: 16
PIF_VALUE: 17
PIF_VALUE: 4
PIF_VALUE: 17
PIF_VALUE: 15
PIF_VALUE: 17
PIF_VALUE: 16
PIF_VALUE: 17
PIF_VALUE: 16
PIF_VALUE: 17
PIF_VALUE: 18
PIF_VALUE: 17
PIF_VALUE: 15
PIF_VALUE: 16
PIF_VALUE: 15
PIF_VALUE: 16
PIF_VALUE: 14
PIF_VALUE: 17
PIF_VALUE: 1
PIF_VALUE: 15
PIF_VALUE: 17
PIF_VALUE: 16
PIF_VALUE: 17
PIF_VALUE: 16
PIF_VALUE: 17
PIF_VALUE: 17
PIF_VALUE: 16
PIF_VALUE: 16
PIF_VALUE: 17
PIF_VALUE: 15
PIF_VALUE: 17
PIF_VALUE: 17
PIF_VALUE: 16
PIF_VALUE: 17
PIF_VALUE: 14
PIF_VALUE: 18
PIF_VALUE: 16
PIF_VALUE: 10
PIF_VALUE: 16
PIF_VALUE: 17
PIF_VALUE: 17
PIF_VALUE: 16
PIF_VALUE: 17
PIF_VALUE: 17
PIF_VALUE: 16
PIF_VALUE: 17
PIF_VALUE: 16
PIF_VALUE: 17
PIF_VALUE: 16
PIF_VALUE: 17
PIF_VALUE: 18
PIF_VALUE: 16
PIF_VALUE: 17
PIF_VALUE: 1
PIF_VALUE: 18
PIF_VALUE: 16
PIF_VALUE: 17
PIF_VALUE: 17
PIF_VALUE: 16
PIF_VALUE: 15
PIF_VALUE: 17
PIF_VALUE: 18
PIF_VALUE: 16
PIF_VALUE: 17
PIF_VALUE: 16
PIF_VALUE: 17
PIF_VALUE: 1
PIF_VALUE: 16
PIF_VALUE: 16
PIF_VALUE: 17
PIF_VALUE: 16
PIF_VALUE: 17
PIF_VALUE: 1
PIF_VALUE: 17
PIF_VALUE: 18
PIF_VALUE: 17
PIF_VALUE: 29
PIF_VALUE: 15
PIF_VALUE: 17
PIF_VALUE: 16
PIF_VALUE: 14
PIF_VALUE: 17
PIF_VALUE: 15
PIF_VALUE: 17
PIF_VALUE: 16
PIF_VALUE: 16
PIF_VALUE: 17
PIF_VALUE: 18
PIF_VALUE: 16
PIF_VALUE: 16
PIF_VALUE: 17
PIF_VALUE: 16
PIF_VALUE: 14
PIF_VALUE: 16
PIF_VALUE: 17
PIF_VALUE: 17
PIF_VALUE: 16
PIF_VALUE: 16
PIF_VALUE: 18
PIF_VALUE: 17
PIF_VALUE: 16
PIF_VALUE: 17
PIF_VALUE: 16
PIF_VALUE: 17
PIF_VALUE: 14
PIF_VALUE: 16
PIF_VALUE: 17
PIF_VALUE: 16
PIF_VALUE: 15
PIF_VALUE: 15
PIF_VALUE: 17
PIF_VALUE: 14
PIF_VALUE: 15
PIF_VALUE: 17
PIF_VALUE: 18
PIF_VALUE: 16
PIF_VALUE: 18
PIF_VALUE: 17
PIF_VALUE: 16
PIF_VALUE: 17
PIF_VALUE: 17
PIF_VALUE: 1
PIF_VALUE: 17
PIF_VALUE: 16
PIF_VALUE: 14
PIF_VALUE: 17
PIF_VALUE: 16
PIF_VALUE: 17
PIF_VALUE: 17
PIF_VALUE: 16
PIF_VALUE: 17
PIF_VALUE: 16
PIF_VALUE: 16
PIF_VALUE: 1
PIF_VALUE: 17
PIF_VALUE: 3
PIF_VALUE: 17
PIF_VALUE: 18
PIF_VALUE: 16
PIF_VALUE: 16
PIF_VALUE: 17
PIF_VALUE: 2
PIF_VALUE: 18
PIF_VALUE: 17
PIF_VALUE: 16
PIF_VALUE: 17
PIF_VALUE: 17
PIF_VALUE: 16
PIF_VALUE: 17
PIF_VALUE: 17
PIF_VALUE: 16
PIF_VALUE: 17
PIF_VALUE: 13
PIF_VALUE: 16
PIF_VALUE: 17
PIF_VALUE: 17
PIF_VALUE: 15
PIF_VALUE: 16
PIF_VALUE: 17
PIF_VALUE: 16
PIF_VALUE: 17
PIF_VALUE: 16
PIF_VALUE: 15
PIF_VALUE: 17
PIF_VALUE: 17
PIF_VALUE: 16
PIF_VALUE: 17
PIF_VALUE: 16
PIF_VALUE: 16
PIF_VALUE: 14
PIF_VALUE: 17
PIF_VALUE: 15
PIF_VALUE: 17
PIF_VALUE: 17
PIF_VALUE: 16
PIF_VALUE: 17
PIF_VALUE: 16
PIF_VALUE: 5
PIF_VALUE: 17
PIF_VALUE: 17
PIF_VALUE: 16
PIF_VALUE: 17
PIF_VALUE: 17
PIF_VALUE: 34
PIF_VALUE: 18
PIF_VALUE: 17

## 2019-02-20 ASSESSMENT — PAIN SCALES - GENERAL
PAINLEVEL_OUTOF10: 0

## 2019-02-21 ENCOUNTER — TELEPHONE (OUTPATIENT)
Dept: CARDIOLOGY CLINIC | Age: 79
End: 2019-02-21

## 2019-02-21 VITALS
HEIGHT: 70 IN | BODY MASS INDEX: 25.88 KG/M2 | DIASTOLIC BLOOD PRESSURE: 90 MMHG | TEMPERATURE: 98 F | RESPIRATION RATE: 14 BRPM | SYSTOLIC BLOOD PRESSURE: 112 MMHG | HEART RATE: 72 BPM | WEIGHT: 180.78 LBS | OXYGEN SATURATION: 98 %

## 2019-02-21 PROCEDURE — 97162 PT EVAL MOD COMPLEX 30 MIN: CPT

## 2019-02-21 PROCEDURE — 99238 HOSP IP/OBS DSCHRG MGMT 30/<: CPT | Performed by: INTERNAL MEDICINE

## 2019-02-21 PROCEDURE — 97116 GAIT TRAINING THERAPY: CPT

## 2019-02-21 PROCEDURE — G0378 HOSPITAL OBSERVATION PER HR: HCPCS

## 2019-02-21 PROCEDURE — 6370000000 HC RX 637 (ALT 250 FOR IP): Performed by: INTERNAL MEDICINE

## 2019-02-21 PROCEDURE — 97530 THERAPEUTIC ACTIVITIES: CPT

## 2019-02-21 PROCEDURE — 2580000003 HC RX 258: Performed by: ANESTHESIOLOGY

## 2019-02-21 RX ORDER — PAROXETINE 30 MG/1
30 TABLET, FILM COATED ORAL DAILY
Qty: 30 TABLET | Refills: 3 | Status: SHIPPED | OUTPATIENT
Start: 2019-02-22 | End: 2019-02-22 | Stop reason: CLARIF

## 2019-02-21 RX ORDER — PAROXETINE 10 MG/1
10 TABLET, FILM COATED ORAL DAILY
Qty: 30 TABLET | Refills: 3 | Status: ON HOLD | OUTPATIENT
Start: 2019-03-06 | End: 2019-09-28

## 2019-02-21 RX ORDER — MAGNESIUM HYDROXIDE/ALUMINUM HYDROXICE/SIMETHICONE 120; 1200; 1200 MG/30ML; MG/30ML; MG/30ML
30 SUSPENSION ORAL ONCE
Status: DISCONTINUED | OUTPATIENT
Start: 2019-02-21 | End: 2019-02-21 | Stop reason: HOSPADM

## 2019-02-21 RX ORDER — FLECAINIDE ACETATE 50 MG/1
50 TABLET ORAL EVERY 12 HOURS SCHEDULED
Qty: 60 TABLET | Refills: 3 | Status: SHIPPED | OUTPATIENT
Start: 2019-02-21 | End: 2019-02-21

## 2019-02-21 RX ORDER — PAROXETINE HYDROCHLORIDE 20 MG/1
20 TABLET, FILM COATED ORAL DAILY
Qty: 30 TABLET | Refills: 3 | Status: SHIPPED | OUTPATIENT
Start: 2019-02-27 | End: 2019-02-22 | Stop reason: CLARIF

## 2019-02-21 RX ORDER — FLECAINIDE ACETATE 50 MG/1
50 TABLET ORAL EVERY 12 HOURS SCHEDULED
Qty: 30 TABLET | Refills: 0 | Status: SHIPPED | OUTPATIENT
Start: 2019-02-21 | End: 2019-04-08 | Stop reason: SDUPTHER

## 2019-02-21 RX ADMIN — ACETAMINOPHEN 650 MG: 325 TABLET, FILM COATED ORAL at 02:07

## 2019-02-21 RX ADMIN — ATORVASTATIN CALCIUM 10 MG: 10 TABLET, FILM COATED ORAL at 08:03

## 2019-02-21 RX ADMIN — APIXABAN 5 MG: 5 TABLET, FILM COATED ORAL at 08:04

## 2019-02-21 RX ADMIN — Medication 10 ML: at 08:05

## 2019-02-21 RX ADMIN — ZOLPIDEM TARTRATE 5 MG: 5 TABLET ORAL at 00:12

## 2019-02-21 RX ADMIN — METOPROLOL TARTRATE 12.5 MG: 25 TABLET ORAL at 08:03

## 2019-02-21 RX ADMIN — PAROXETINE HYDROCHLORIDE 30 MG: 10 TABLET, FILM COATED ORAL at 08:04

## 2019-02-21 RX ADMIN — FLECAINIDE ACETATE 50 MG: 100 TABLET ORAL at 08:03

## 2019-02-21 RX ADMIN — LISINOPRIL AND HYDROCHLOROTHIAZIDE 1 TABLET: 12.5; 1 TABLET ORAL at 08:03

## 2019-02-21 ASSESSMENT — PAIN SCALES - GENERAL
PAINLEVEL_OUTOF10: 3
PAINLEVEL_OUTOF10: 0

## 2019-02-22 ENCOUNTER — TELEPHONE (OUTPATIENT)
Dept: PHARMACY | Facility: CLINIC | Age: 79
End: 2019-02-22

## 2019-02-22 DIAGNOSIS — I48.91 ATRIAL FIBRILLATION, UNSPECIFIED TYPE (HCC): Primary | ICD-10-CM

## 2019-02-22 PROCEDURE — 1111F DSCHRG MED/CURRENT MED MERGE: CPT

## 2019-02-22 RX ORDER — TEMAZEPAM 30 MG/1
30 CAPSULE ORAL NIGHTLY
COMMUNITY
Start: 2019-02-04

## 2019-02-22 RX ORDER — PAROXETINE 30 MG/1
30 TABLET, FILM COATED ORAL DAILY
Status: ON HOLD | COMMUNITY
Start: 2019-02-22 | End: 2019-09-28

## 2019-02-22 RX ORDER — PAROXETINE HYDROCHLORIDE 20 MG/1
20 TABLET, FILM COATED ORAL DAILY
COMMUNITY
Start: 2019-02-27 | End: 2022-09-21

## 2019-02-22 RX ORDER — OXYCODONE HYDROCHLORIDE AND ACETAMINOPHEN 5; 325 MG/1; MG/1
1 TABLET ORAL EVERY 8 HOURS PRN
Status: ON HOLD | COMMUNITY
End: 2019-09-28

## 2019-02-27 ENCOUNTER — TELEPHONE (OUTPATIENT)
Dept: CARDIOLOGY CLINIC | Age: 79
End: 2019-02-27

## 2019-03-27 ENCOUNTER — NURSE ONLY (OUTPATIENT)
Dept: CARDIOLOGY CLINIC | Age: 79
End: 2019-03-27
Payer: MEDICARE

## 2019-03-27 DIAGNOSIS — I48.0 PAROXYSMAL ATRIAL FIBRILLATION (HCC): Primary | ICD-10-CM

## 2019-03-27 PROCEDURE — 93000 ELECTROCARDIOGRAM COMPLETE: CPT | Performed by: INTERNAL MEDICINE

## 2019-04-08 RX ORDER — FLECAINIDE ACETATE 50 MG/1
50 TABLET ORAL EVERY 12 HOURS SCHEDULED
Qty: 60 TABLET | Refills: 3 | Status: SHIPPED | OUTPATIENT
Start: 2019-04-08 | End: 2019-06-03 | Stop reason: ALTCHOICE

## 2019-04-08 NOTE — TELEPHONE ENCOUNTER
Patient is calling to see if he should continue taking his flecainide. If so pt needs a new script sent to his pharmacy. You can reach the pt at [phone].     Pharmacy:  [facility], [address] -  [phone]

## 2019-04-24 ENCOUNTER — NURSE ONLY (OUTPATIENT)
Dept: CARDIOLOGY CLINIC | Age: 79
End: 2019-04-24
Payer: MEDICARE

## 2019-04-24 DIAGNOSIS — I48.0 PAROXYSMAL ATRIAL FIBRILLATION (HCC): Primary | ICD-10-CM

## 2019-04-24 PROCEDURE — 93000 ELECTROCARDIOGRAM COMPLETE: CPT | Performed by: INTERNAL MEDICINE

## 2019-04-25 ENCOUNTER — TELEPHONE (OUTPATIENT)
Dept: CARDIOLOGY CLINIC | Age: 79
End: 2019-04-25

## 2019-04-25 NOTE — TELEPHONE ENCOUNTER
Pt had an EKG (please review) yday per Dr Apolonia Maldonado  Currently taking Paxil 20 mg, tolerating  Per previous phone note pt is to supposed to be weaned off    Pt also inquiring if he needs an appt with Dr Apolonia Maldonado and would like to further discuss with a nurse as there was some confusion about his treatment plan    Please assist further

## 2019-04-25 NOTE — TELEPHONE ENCOUNTER
Dr. Tasha Hughes    Patient currently taking Paxil along with flecainide and metoprolol. You discussed previously with Shandra Interiano CNP regarding weaning his dose. He is currently taking 30 mg and she was going to wean him to 20 mg. He did complete an EKG yesterday in office with QTc of 490. Patient is calling regarding a plan going forward. He is not scheduled to see you in office for follow up until 6/3/19. Please advise regarding current medications.

## 2019-04-25 NOTE — TELEPHONE ENCOUNTER
Spoke with Percy and let him know that his medications would be discussed during his follow up appointment. I let him know to try and cut his Paxil to 10 mg daily before his next appointment. He states that he doesn't want to do that he wants to stop all of his other medications. I explained to him that he cannot stop all of his medications since they are needed for his heart. He said he would just wait until his follow up to discuss everything.

## 2019-05-31 NOTE — PROGRESS NOTES
[address]   Cardiac Electrophysiology Consultation   Date: 5/31/2019  Reason for Consultation: Afib  Consult Requesting Physician: Evangelina Ayala MD  Primary Care Physician: Dylan Dixon MD    Chief Complaint:   No chief complaint on file. HPI: Marina Beyer is a 66 y.o. with a PMH significant for HTN and paroxysmal afib (dating back to at least 2009) who is typically followed by Dr. Marli Thacker for his cardiac needs. He was seen in office for initial consultation on 10/23/19 to discuss treatment options for his symptomatic paroxysmal Afib. He had been on Amiodarone since 2017 and was interested in perusing invasive therapy versus medical management. Atrial fibrillation  ablation was discuss and patient decided to proceed and subsequently underwent Afib ablation on 20/20/19    Today patient present for follow up s/p afib ablation 2/20/19. Today EKG confirms sinus rhythm. He is compliant with his medications and tolerating them well. He denies chest pain/pressure, tightness, edema, shortness of breath, heart racing, palpitations, lightheadedness, dizziness, syncope, presyncope,  PND or orthopnea. Past Medical History:   Diagnosis Date    Atrial fibrillation ([address]     Back pain     Cancer ([facility] [address]     prostate    Hyperlipidemia     Hypertension         Past Surgical History:   Procedure Laterality Date    BACK SURGERY      COLONOSCOPY  12/30/2016    Dr Tonya Bee TONSILLECTOMY         Allergies:  No Known Allergies    Medication:   Prior to Admission medications    Medication Sig Start Date End Date Taking? Authorizing Provider   flecainide (TAMBOCOR) 50 MG tablet Take 1 tablet by mouth every 12 hours 4/8/19   Sunny Hubbard MD   temazepam (RESTORIL) 30 MG capsule Take 30 mg by mouth nightly. . 2/4/19   Historical Provider, MD   oxyCODONE-acetaminophen (PERCOCET) 5-325 MG per tablet Take 1 tablet by mouth every 8 hours as needed for Pain. [employer]     Historical Provider, MD   PARoxetine (PAXIL) 30 MG tablet Take 30 mg by mouth daily 2/22/19 2/26/19  Historical Provider, MD   PARoxetine (PAXIL) 20 MG tablet Take 20 mg by mouth daily 2/27/19 3/5/19  Historical Provider, MD   PARoxetine (PAXIL) 10 MG tablet Take 1 tablet by mouth daily 3/6/19   Harpreet Lynne MD   metoprolol tartrate (LOPRESSOR) 25 MG tablet Take 0.5 tablets by mouth 2 times daily 2/21/19   Harpreet Lynne MD   ELIQUIS 5 MG TABS tablet TAKE ONE TABLET BY MOUTH TWICE A DAY 1/8/19   Bhaskar Paulson MD   lisinopril-hydrochlorothiazide (PRINZIDE;ZESTORETIC) 10-12.5 MG per tablet Take 1 tablet by mouth daily 1/4/17   Bhaskar Paulson MD   atorvastatin (LIPITOR) 10 MG tablet Take 1 tablet by mouth daily 1/4/17   Bhaskar Paulson MD   potassium chloride (KLOR-CON 10) 10 MEQ extended release tablet Take 1 tablet by mouth daily 1/4/17   Bhaskar Paulson MD   vitamin D (ERGOCALCIFEROL) 09492 UNITS CAPS capsule Take 50,000 Units by mouth once a week    Historical Provider, MD       Social History:   reports that he quit smoking about 33 years ago. He quit after 12.00 years of use. He has never used smokeless tobacco. He reports that he does not drink alcohol or use drugs. Family History:  family history includes Colon Cancer in his brother. Reviewed.  Denies family history of sudden cardiac death, arrhythmia, premature CAD    Review of System:    · General ROS: negative for - chills, fever   · Psychological ROS: negative for - anxiety or depression  · Ophthalmic ROS: negative for - eye pain or loss of vision  · ENT ROS: negative for - epistaxis, headaches, nasal discharge, sore throat   · Allergy and Immunology ROS: negative for - hives, nasal congestion   · Hematological and Lymphatic ROS: negative for - bleeding problems, blood clots, bruising or jaundice  · Endocrine ROS: negative for - skin changes, temperature intolerance or unexpected weight changes  · Respiratory ROS: negative for - cough, hemoptysis, pleuritic pain, SOB, Holter. Clinical correlation required. 2. Echo:   Summary  Normal LV size and systolic function. Estimated ejection fraction is 60%. The left atrium is normal in size. Trivial to mild mitral, aortic, tricuspid and pulmonic regurgitation. Normal right ventricular size and function. RVSP of 34 mmHg. LA Dimension: 3.7 cm  LA Area: 18.5 cm2  LA volume/Index: 44 ml /22 ml/m2    3. Stress Test:  6/2009 ([facility])  No evidence of ischemia, normal myocardial perfusion study. 4. Cath:   None     Procedures:  1. Successful DCCV 2016  2. Atrial Fibrillation ablation 2/20/19    Assessment:   Patient Active Problem List    Diagnosis Date Noted    A-fib [facility]) 02/20/2019    Paroxysmal atrial fibrillation ([facility] [address] 02/20/2019        Plan:    Afib:  -Persistent (captured on EKG 11/22/16)  -s/p Afib ablation 2/20/19  -Today EKG confirms continued sinus rhythm  -CHADS Vasc 3 (HTN, age)  -Continue Eliquis 5 mg BID. No reported abnormal bruising or bleeding.   -Stop Flecainide and Lopressor in order to assess new baseline cardiac rhythm apart from any anti-arrhythmic medications.  -30 day event monitor to assess for afib burden    Follow up after 30 day monitor. This note was scribed in the presence of Nikunj Stallings MD by Jose Hurley RN. The scribe's documentation has been prepared under my direction and personally reviewed by me in its entirety. I confirm that the note above accurately reflects all work, physical examination, the discussion of treatments and procedures, and medical decision making performed by me.     Nikunj Stallings MD, [address], [facility], [facility]  Cardiac Electrophysiology  [address]   Office: [phone]

## 2019-06-03 ENCOUNTER — OFFICE VISIT (OUTPATIENT)
Dept: CARDIOLOGY CLINIC | Age: 79
End: 2019-06-03
Payer: MEDICARE

## 2019-06-03 VITALS
WEIGHT: 192 LBS | DIASTOLIC BLOOD PRESSURE: 72 MMHG | SYSTOLIC BLOOD PRESSURE: 136 MMHG | HEIGHT: 70 IN | HEART RATE: 56 BPM | BODY MASS INDEX: 27.49 KG/M2 | OXYGEN SATURATION: 99 %

## 2019-06-03 DIAGNOSIS — I48.0 PAROXYSMAL ATRIAL FIBRILLATION (HCC): Primary | ICD-10-CM

## 2019-06-03 PROCEDURE — 99214 OFFICE O/P EST MOD 30 MIN: CPT | Performed by: INTERNAL MEDICINE

## 2019-06-03 PROCEDURE — 93000 ELECTROCARDIOGRAM COMPLETE: CPT | Performed by: INTERNAL MEDICINE

## 2019-06-03 NOTE — LETTER
[facility]   Cardiac Electrophysiology Consultation   Date: 5/31/2019  Reason for Consultation: Afib  Consult Requesting Physician: Umair Jason MD  Primary Care Physician: Faisal Molina MD    Chief Complaint:   No chief complaint on file. HPI: Damon Roasrio is a 66 y.o. with a PMH significant for HTN and paroxysmal afib (dating back to at least 2009) who is typically followed by Dr. Joselito Lozano for his cardiac needs. He was seen in office for initial consultation on 10/23/19 to discuss treatment options for his symptomatic paroxysmal Afib. He had been on Amiodarone since 2017 and was interested in perusing invasive therapy versus medical management. Atrial fibrillation  ablation was discuss and patient decided to proceed and subsequently underwent Afib ablation on 20/20/19    Today patient present for follow up s/p afib ablation 2/20/19. Today EKG confirms sinus rhythm. He is compliant with his medications and tolerating them well. He denies chest pain/pressure, tightness, edema, shortness of breath, heart racing, palpitations, lightheadedness, dizziness, syncope, presyncope,  PND or orthopnea. Past Medical History:   Diagnosis Date    Atrial fibrillation ([address]     Back pain     Cancer ([facility] [address]     prostate    Hyperlipidemia     Hypertension         Past Surgical History:   Procedure Laterality Date    BACK SURGERY      COLONOSCOPY  12/30/2016    Dr Swapnil Petit TONSILLECTOMY         Allergies:  No Known Allergies    Medication:   Prior to Admission medications    Medication Sig Start Date End Date Taking? Authorizing Provider   flecainide (TAMBOCOR) 50 MG tablet Take 1 tablet by mouth every 12 hours 4/8/19   Penny Ervin MD   temazepam (RESTORIL) 30 MG capsule Take 30 mg by mouth nightly. . 2/4/19   Historical Provider, MD   oxyCODONE-acetaminophen (PERCOCET) 5-325 MG per tablet Take 1 tablet by mouth every 8 hours as needed for Pain. Fide Cameron Historical Provider, MD   PARoxetine (PAXIL) 30 MG tablet Take 30 mg by mouth daily 2/22/19 2/26/19  Historical Provider, MD   PARoxetine (PAXIL) 20 MG tablet Take 20 mg by mouth daily 2/27/19 3/5/19  Historical Provider, MD   PARoxetine (PAXIL) 10 MG tablet Take 1 tablet by mouth daily 3/6/19   Kyleigh Ware MD   metoprolol tartrate (LOPRESSOR) 25 MG tablet Take 0.5 tablets by mouth 2 times daily 2/21/19   Kyleigh Ware MD   ELIQUIS 5 MG TABS tablet TAKE ONE TABLET BY MOUTH TWICE A DAY 1/8/19   Wyatt Bustillos MD   lisinopril-hydrochlorothiazide (PRINZIDE;ZESTORETIC) 10-12.5 MG per tablet Take 1 tablet by mouth daily 1/4/17   Wyatt Bustillos MD   atorvastatin (LIPITOR) 10 MG tablet Take 1 tablet by mouth daily 1/4/17   Wyatt Bustillos MD   potassium chloride (KLOR-CON 10) 10 MEQ extended release tablet Take 1 tablet by mouth daily 1/4/17   Wyatt Bustillos MD   vitamin D (ERGOCALCIFEROL) 06495 UNITS CAPS capsule Take 50,000 Units by mouth once a week    Historical Provider, MD       Social History:   reports that he quit smoking about 33 years ago. He quit after 12.00 years of use. He has never used smokeless tobacco. He reports that he does not drink alcohol or use drugs. Family History:  family history includes Colon Cancer in his brother. Reviewed.  Denies family history of sudden cardiac death, arrhythmia, premature CAD    Review of System:    · General ROS: negative for - chills, fever   · Psychological ROS: negative for - anxiety or depression  · Ophthalmic ROS: negative for - eye pain or loss of vision  · ENT ROS: negative for - epistaxis, headaches, nasal discharge, sore throat   · Allergy and Immunology ROS: negative for - hives, nasal congestion   · Hematological and Lymphatic ROS: negative for - bleeding problems, blood clots, bruising or jaundice  · Endocrine ROS: negative for - skin changes, temperature intolerance or unexpected weight changes · Respiratory ROS: negative for - cough, hemoptysis, pleuritic pain, SOB, sputum changes or wheezing  · Cardiovascular ROS: Per HPI. · Gastrointestinal ROS: negative for - abdominal pain, blood in stools, diarrhea, hematemesis, melena,  nausea/vomiting or swallowing difficulty/pain  · Genito-Urinary ROS: negative for - dysuria or incontinence  · Musculoskeletal ROS: negative for - joint swelling or muscle pain  · Neurological ROS: negative for - confusion, dizziness, gait disturbance, headaches, numbness/tingling, seizures,  speech problems, tremors, visual changes or weakness  · Dermatological ROS: negative for - rash    Physical Examination:  There were no vitals filed for this visit. · Constitutional: Oriented. No distress. · Head: Normocephalic and atraumatic. · Mouth/Throat: Oropharynx is clear and moist.   · Eyes: Conjunctivae normal. EOM are normal.   · Neck: Normal range of motion. Neck supple. No rigidity. No JVD present. · Cardiovascular: Normal rate, regular rhythm, S1&S2 and intact distal pulses. · Pulmonary/Chest: Bilateral respiratory sounds. No wheezes. No rhonchi. · Abdominal: Soft. Bowel sounds present. No distension, No tenderness. · Musculoskeletal: No tenderness. No edema    · Lymphadenopathy: Has no cervical adenopathy. · Neurological: Alert and oriented. Cranial nerve appears intact, No Gross deficit   · Skin: Skin is warm and dry. No rash noted. · Psychiatric: Has a normal mood, affect and behavior     Labs:  Reviewed. EC/3/19 reviewed, Sinus bradycardia, low voltage in precordial leads. Nonspecific T-abnormality,  v-rate of 56 bpm, QRS duration 96 ms. No pathologic Q waves, ventricular pre-excitation, or QT prolongation. Studies:     1. 48 Hour Holter Monitor: 2014 ([facility])  Normal sinus rhythm with an excess of premature ventricular beats, some of which the  patient noted as irregular heart beat.   There are several short runs of atrial

## 2019-06-03 NOTE — PATIENT INSTRUCTIONS
[employer], and be sure to contact your doctor if:    · You would like help planning heart-healthy meals. Where can you learn more? Go to [email or web address] and sign in to your Datalink account. Enter V137 in the Soko box to learn more about \"Heart-Healthy Diet: Care Instructions. \"     If you do not have an account, please click on the \"Sign Up Now\" link. Current as of: July 22, 2018  Content Version: 12.0  © 3491-1036 Healthwise, Incorporated. Care instructions adapted under license by [facility] ([facility] [place] If you have questions about a medical condition or this instruction, always ask your healthcare professional. [address] any warranty or liability for your use of this information.

## 2019-07-08 PROCEDURE — 93228 REMOTE 30 DAY ECG REV/REPORT: CPT | Performed by: INTERNAL MEDICINE

## 2019-07-09 ENCOUNTER — TELEPHONE (OUTPATIENT)
Dept: CARDIOLOGY CLINIC | Age: 79
End: 2019-07-09

## 2019-07-09 DIAGNOSIS — I48.0 PAROXYSMAL ATRIAL FIBRILLATION (HCC): ICD-10-CM

## 2019-07-17 ENCOUNTER — OFFICE VISIT (OUTPATIENT)
Dept: CARDIOLOGY CLINIC | Age: 79
End: 2019-07-17
Payer: MEDICARE

## 2019-07-17 VITALS
DIASTOLIC BLOOD PRESSURE: 64 MMHG | HEART RATE: 74 BPM | SYSTOLIC BLOOD PRESSURE: 122 MMHG | OXYGEN SATURATION: 97 % | BODY MASS INDEX: 26.92 KG/M2 | HEIGHT: 70 IN | WEIGHT: 188 LBS

## 2019-07-17 DIAGNOSIS — I48.0 PAROXYSMAL ATRIAL FIBRILLATION (HCC): Primary | ICD-10-CM

## 2019-07-17 PROCEDURE — 99215 OFFICE O/P EST HI 40 MIN: CPT | Performed by: INTERNAL MEDICINE

## 2019-07-17 PROCEDURE — 93000 ELECTROCARDIOGRAM COMPLETE: CPT | Performed by: INTERNAL MEDICINE

## 2019-07-17 NOTE — LETTER
provides no short nor long term efficacy. Anti-arrhythmic medications has been very difficult to control left atrial flutter, both in regards to heart rate and rhythm control even with a powerful anti-arrhythmic medication (amiodarone). Left atrial flutter ablation is a potentially curative therapy with very reasonable success rate. The risks, benefits and alternatives of the left atrial flutter ablation procedure were discussed with the patient. The risks including, but not limited to, the risks of bleeding, infection, radiation exposure, injury to vascular, cardiac and surrounding structures (including pneumothorax), stroke, cardiac perforation, tamponade, need for emergent open heart surgery, need for pacemaker implantation, injury to the phrenic nerve, injury to the esophagus, myocardial infarction and death were discussed in detail.     -The patient opted to proceed with the left atrial flutter ablation. Will schedule for radiofrequency ablation with [employer] system with a MARTINEZ procedure immediately before the ablation. Hold Eliquis for 12 hours prior to procedure. Will order BMP, CBC, PT/INR, and Type & Screen prior to the procedure. Follow up 3 months after left atrial flutter ablation. This note was scribed in the presence of Bernice Cast MD by Devora Whatley RN. The scribe's documentation has been prepared under my direction and personally reviewed by me in its entirety. I confirm that the note above accurately reflects all work, physical examination, the discussion of treatments and procedures, and medical decision making performed by me.     Bernice Cast MD, [address], [facility] [place], [facility]  Cardiac Electrophysiology  [address]    Office: [phone]

## 2019-07-23 ENCOUNTER — TELEPHONE (OUTPATIENT)
Dept: CARDIOLOGY CLINIC | Age: 79
End: 2019-07-23

## 2019-07-23 DIAGNOSIS — I48.0 PAROXYSMAL ATRIAL FIBRILLATION (HCC): Primary | ICD-10-CM

## 2019-07-23 NOTE — TELEPHONE ENCOUNTER
Left message on patient voicemail instructing to call back regarding scheduling of his left atrial flutter ablation. Left Atrial Flutter Ablation   Procedure Date : 1/14/20 Tues   Arrival Time: 6:45 am   Procedure Time: 7:30 am    The morning of your procedure you will park in the hospital parking lot and report directly to the cath lab to check in.      Pre-Procedure Instructions   1. You will need to fast (nothing to eat or drink) for at least 8 hours prior to procedure. No caffeine the morning of.   2. Your will need to hold your Eliquis for 12 hours prior to the procedure. 3. Do not use any lotions, creams or perfume the morning of procedure. 4. Pre-procedure lab work will need to be completed 3 days prior to procedure. 5. Please have a responsible adult to drive you home after procedure, you will stay overnight.    6. Cath lab will provide you with all post procedure instructions     3 month follow up will be scheduled with Dr. Paco Ramires post procedure

## 2019-08-14 RX ORDER — APIXABAN 5 MG/1
TABLET, FILM COATED ORAL
Qty: 60 TABLET | Refills: 4 | Status: SHIPPED | OUTPATIENT
Start: 2019-08-14 | End: 2020-03-09

## 2019-09-20 DIAGNOSIS — I48.0 PAROXYSMAL ATRIAL FIBRILLATION (HCC): ICD-10-CM

## 2019-09-20 LAB
ABO/RH: NORMAL
ANION GAP SERPL CALCULATED.3IONS-SCNC: 13 MMOL/L (ref 3–16)
ANTIBODY SCREEN: NORMAL
BUN BLDV-MCNC: 7 MG/DL (ref 7–20)
CALCIUM SERPL-MCNC: 9.2 MG/DL (ref 8.3–10.6)
CHLORIDE BLD-SCNC: 92 MMOL/L (ref 99–110)
CO2: 26 MMOL/L (ref 21–32)
CREAT SERPL-MCNC: 1 MG/DL (ref 0.8–1.3)
GFR AFRICAN AMERICAN: >60
GFR NON-AFRICAN AMERICAN: >60
GLUCOSE BLD-MCNC: 103 MG/DL (ref 70–99)
HCT VFR BLD CALC: 34.9 % (ref 40.5–52.5)
HEMOGLOBIN: 11.9 G/DL (ref 13.5–17.5)
MCH RBC QN AUTO: 34.8 PG (ref 26–34)
MCHC RBC AUTO-ENTMCNC: 34.1 G/DL (ref 31–36)
MCV RBC AUTO: 102.1 FL (ref 80–100)
PDW BLD-RTO: 13.1 % (ref 12.4–15.4)
PLATELET # BLD: 167 K/UL (ref 135–450)
PMV BLD AUTO: 8.7 FL (ref 5–10.5)
POTASSIUM SERPL-SCNC: 3.8 MMOL/L (ref 3.5–5.1)
RBC # BLD: 3.42 M/UL (ref 4.2–5.9)
SODIUM BLD-SCNC: 131 MMOL/L (ref 136–145)
WBC # BLD: 5.1 K/UL (ref 4–11)

## 2019-09-26 ENCOUNTER — ANESTHESIA EVENT (OUTPATIENT)
Dept: CARDIAC CATH/INVASIVE PROCEDURES | Age: 79
End: 2019-09-26

## 2019-09-27 ENCOUNTER — HOSPITAL ENCOUNTER (INPATIENT)
Dept: CARDIAC CATH/INVASIVE PROCEDURES | Age: 79
LOS: 1 days | Discharge: HOME | DRG: 273 | End: 2019-09-28
Attending: INTERNAL MEDICINE | Admitting: INTERNAL MEDICINE
Payer: MEDICARE

## 2019-09-27 ENCOUNTER — ANESTHESIA (OUTPATIENT)
Dept: CARDIAC CATH/INVASIVE PROCEDURES | Age: 79
End: 2019-09-27

## 2019-09-27 ENCOUNTER — APPOINTMENT (OUTPATIENT)
Dept: CT IMAGING | Age: 79
DRG: 273 | End: 2019-09-27
Attending: INTERNAL MEDICINE
Payer: MEDICARE

## 2019-09-27 ENCOUNTER — APPOINTMENT (OUTPATIENT)
Dept: GENERAL RADIOLOGY | Age: 79
DRG: 273 | End: 2019-09-27
Attending: INTERNAL MEDICINE
Payer: MEDICARE

## 2019-09-27 VITALS
OXYGEN SATURATION: 96 % | TEMPERATURE: 92.8 F | SYSTOLIC BLOOD PRESSURE: 83 MMHG | DIASTOLIC BLOOD PRESSURE: 54 MMHG | RESPIRATION RATE: 2 BRPM

## 2019-09-27 LAB
ABO/RH: NORMAL
ACTIVATED CLOTTING TIME: 354 SEC (ref 99–130)
ACTIVATED CLOTTING TIME: 360 SEC (ref 99–130)
ACTIVATED CLOTTING TIME: 400 SEC (ref 99–130)
ANION GAP SERPL CALCULATED.3IONS-SCNC: 14 MMOL/L (ref 3–16)
ANTIBODY SCREEN: NORMAL
BLOOD BANK DISPENSE STATUS: NORMAL
BLOOD BANK DISPENSE STATUS: NORMAL
BLOOD BANK PRODUCT CODE: NORMAL
BLOOD BANK PRODUCT CODE: NORMAL
BPU ID: NORMAL
BPU ID: NORMAL
BUN BLDV-MCNC: 7 MG/DL (ref 7–20)
CALCIUM SERPL-MCNC: 7.5 MG/DL (ref 8.3–10.6)
CHLORIDE BLD-SCNC: 103 MMOL/L (ref 99–110)
CO2: 22 MMOL/L (ref 21–32)
CREAT SERPL-MCNC: 1 MG/DL (ref 0.8–1.3)
DESCRIPTION BLOOD BANK: NORMAL
DESCRIPTION BLOOD BANK: NORMAL
EKG ATRIAL RATE: 76 BPM
EKG DIAGNOSIS: NORMAL
EKG P AXIS: 20 DEGREES
EKG P-R INTERVAL: 168 MS
EKG Q-T INTERVAL: 418 MS
EKG QRS DURATION: 80 MS
EKG QTC CALCULATION (BAZETT): 470 MS
EKG R AXIS: -35 DEGREES
EKG T AXIS: -62 DEGREES
EKG VENTRICULAR RATE: 76 BPM
GFR AFRICAN AMERICAN: >60
GFR NON-AFRICAN AMERICAN: >60
GLUCOSE BLD-MCNC: 168 MG/DL (ref 70–99)
HCT VFR BLD CALC: 37.5 % (ref 40.5–52.5)
HEMOGLOBIN: 12.6 G/DL (ref 13.5–17.5)
MCH RBC QN AUTO: 34.6 PG (ref 26–34)
MCHC RBC AUTO-ENTMCNC: 33.7 G/DL (ref 31–36)
MCV RBC AUTO: 102.7 FL (ref 80–100)
PDW BLD-RTO: 13.2 % (ref 12.4–15.4)
PLATELET # BLD: 159 K/UL (ref 135–450)
PMV BLD AUTO: 8.4 FL (ref 5–10.5)
POTASSIUM SERPL-SCNC: 3.3 MMOL/L (ref 3.5–5.1)
RBC # BLD: 3.65 M/UL (ref 4.2–5.9)
SODIUM BLD-SCNC: 139 MMOL/L (ref 136–145)
WBC # BLD: 10.9 K/UL (ref 4–11)

## 2019-09-27 PROCEDURE — 93662 INTRACARDIAC ECG (ICE): CPT | Performed by: INTERNAL MEDICINE

## 2019-09-27 PROCEDURE — 2500000003 HC RX 250 WO HCPCS

## 2019-09-27 PROCEDURE — 93656 COMPRE EP EVAL ABLTJ ATR FIB: CPT | Performed by: INTERNAL MEDICINE

## 2019-09-27 PROCEDURE — 93010 ELECTROCARDIOGRAM REPORT: CPT | Performed by: INTERNAL MEDICINE

## 2019-09-27 PROCEDURE — 86900 BLOOD TYPING SEROLOGIC ABO: CPT

## 2019-09-27 PROCEDURE — 2500000003 HC RX 250 WO HCPCS: Performed by: NURSE ANESTHETIST, CERTIFIED REGISTERED

## 2019-09-27 PROCEDURE — 86923 COMPATIBILITY TEST ELECTRIC: CPT

## 2019-09-27 PROCEDURE — 93662 INTRACARDIAC ECG (ICE): CPT | Performed by: FAMILY MEDICINE

## 2019-09-27 PROCEDURE — 2580000003 HC RX 258: Performed by: ANESTHESIOLOGY

## 2019-09-27 PROCEDURE — 86901 BLOOD TYPING SEROLOGIC RH(D): CPT

## 2019-09-27 PROCEDURE — 02583ZZ DESTRUCTION OF CONDUCTION MECHANISM, PERCUTANEOUS APPROACH: ICD-10-PCS | Performed by: INTERNAL MEDICINE

## 2019-09-27 PROCEDURE — 80048 BASIC METABOLIC PNL TOTAL CA: CPT

## 2019-09-27 PROCEDURE — 93308 TTE F-UP OR LMTD: CPT

## 2019-09-27 PROCEDURE — 93325 DOPPLER ECHO COLOR FLOW MAPG: CPT

## 2019-09-27 PROCEDURE — 85027 COMPLETE CBC AUTOMATED: CPT

## 2019-09-27 PROCEDURE — 6360000004 HC RX CONTRAST MEDICATION: Performed by: INTERNAL MEDICINE

## 2019-09-27 PROCEDURE — 86850 RBC ANTIBODY SCREEN: CPT

## 2019-09-27 PROCEDURE — 93657 TX L/R ATRIAL FIB ADDL: CPT | Performed by: INTERNAL MEDICINE

## 2019-09-27 PROCEDURE — 71045 X-RAY EXAM CHEST 1 VIEW: CPT

## 2019-09-27 PROCEDURE — 2580000003 HC RX 258: Performed by: INTERNAL MEDICINE

## 2019-09-27 PROCEDURE — 93656 COMPRE EP EVAL ABLTJ ATR FIB: CPT | Performed by: FAMILY MEDICINE

## 2019-09-27 PROCEDURE — 37799 UNLISTED PX VASCULAR SURGERY: CPT

## 2019-09-27 PROCEDURE — 93613 INTRACARDIAC EPHYS 3D MAPG: CPT | Performed by: FAMILY MEDICINE

## 2019-09-27 PROCEDURE — 4A0234Z MEASUREMENT OF CARDIAC ELECTRICAL ACTIVITY, PERCUTANEOUS APPROACH: ICD-10-PCS | Performed by: INTERNAL MEDICINE

## 2019-09-27 PROCEDURE — 2580000003 HC RX 258: Performed by: NURSE ANESTHETIST, CERTIFIED REGISTERED

## 2019-09-27 PROCEDURE — C1730 CATH, EP, 19 OR FEW ELECT: HCPCS

## 2019-09-27 PROCEDURE — 2720000010 HC SURG SUPPLY STERILE

## 2019-09-27 PROCEDURE — 93613 INTRACARDIAC EPHYS 3D MAPG: CPT | Performed by: INTERNAL MEDICINE

## 2019-09-27 PROCEDURE — P9041 ALBUMIN (HUMAN),5%, 50ML: HCPCS | Performed by: NURSE ANESTHETIST, CERTIFIED REGISTERED

## 2019-09-27 PROCEDURE — 6360000002 HC RX W HCPCS

## 2019-09-27 PROCEDURE — B246ZZZ ULTRASONOGRAPHY OF RIGHT AND LEFT HEART: ICD-10-PCS | Performed by: INTERNAL MEDICINE

## 2019-09-27 PROCEDURE — 2580000003 HC RX 258

## 2019-09-27 PROCEDURE — 3700000000 HC ANESTHESIA ATTENDED CARE

## 2019-09-27 PROCEDURE — 6360000002 HC RX W HCPCS: Performed by: NURSE ANESTHETIST, CERTIFIED REGISTERED

## 2019-09-27 PROCEDURE — C1732 CATH, EP, DIAG/ABL, 3D/VECT: HCPCS

## 2019-09-27 PROCEDURE — 93005 ELECTROCARDIOGRAM TRACING: CPT | Performed by: INTERNAL MEDICINE

## 2019-09-27 PROCEDURE — C1759 CATH, INTRA ECHOCARDIOGRAPHY: HCPCS

## 2019-09-27 PROCEDURE — 6360000002 HC RX W HCPCS: Performed by: INTERNAL MEDICINE

## 2019-09-27 PROCEDURE — 3700000001 HC ADD 15 MINUTES (ANESTHESIA)

## 2019-09-27 PROCEDURE — 71260 CT THORAX DX C+: CPT

## 2019-09-27 PROCEDURE — C1894 INTRO/SHEATH, NON-LASER: HCPCS

## 2019-09-27 PROCEDURE — 4A023FZ MEASUREMENT OF CARDIAC RHYTHM, PERCUTANEOUS APPROACH: ICD-10-PCS | Performed by: INTERNAL MEDICINE

## 2019-09-27 PROCEDURE — 93655 ICAR CATH ABLTJ DSCRT ARRHYT: CPT | Performed by: FAMILY MEDICINE

## 2019-09-27 PROCEDURE — 85347 COAGULATION TIME ACTIVATED: CPT

## 2019-09-27 PROCEDURE — 2100000000 HC CCU R&B

## 2019-09-27 PROCEDURE — 02K83ZZ MAP CONDUCTION MECHANISM, PERCUTANEOUS APPROACH: ICD-10-PCS | Performed by: INTERNAL MEDICINE

## 2019-09-27 PROCEDURE — 93321 DOPPLER ECHO F-UP/LMTD STD: CPT

## 2019-09-27 PROCEDURE — 93320 DOPPLER ECHO COMPLETE: CPT

## 2019-09-27 PROCEDURE — 6370000000 HC RX 637 (ALT 250 FOR IP): Performed by: INTERNAL MEDICINE

## 2019-09-27 RX ORDER — POTASSIUM CHLORIDE 20 MEQ/1
40 TABLET, EXTENDED RELEASE ORAL PRN
Status: DISCONTINUED | OUTPATIENT
Start: 2019-09-27 | End: 2019-09-28 | Stop reason: HOSPADM

## 2019-09-27 RX ORDER — PROTAMINE SULFATE 10 MG/ML
INJECTION, SOLUTION INTRAVENOUS
Status: COMPLETED
Start: 2019-09-27 | End: 2019-09-27

## 2019-09-27 RX ORDER — PROTAMINE SULFATE 10 MG/ML
50 INJECTION, SOLUTION INTRAVENOUS ONCE
Status: COMPLETED | OUTPATIENT
Start: 2019-09-27 | End: 2019-09-27

## 2019-09-27 RX ORDER — ATORVASTATIN CALCIUM 10 MG/1
10 TABLET, FILM COATED ORAL DAILY
Status: DISCONTINUED | OUTPATIENT
Start: 2019-09-27 | End: 2019-09-28 | Stop reason: HOSPADM

## 2019-09-27 RX ORDER — OXYCODONE HYDROCHLORIDE AND ACETAMINOPHEN 5; 325 MG/1; MG/1
1 TABLET ORAL EVERY 8 HOURS PRN
Status: DISCONTINUED | OUTPATIENT
Start: 2019-09-27 | End: 2019-09-28 | Stop reason: HOSPADM

## 2019-09-27 RX ORDER — ONDANSETRON 2 MG/ML
INJECTION INTRAMUSCULAR; INTRAVENOUS PRN
Status: DISCONTINUED | OUTPATIENT
Start: 2019-09-27 | End: 2019-09-27 | Stop reason: SDUPTHER

## 2019-09-27 RX ORDER — FUROSEMIDE 10 MG/ML
INJECTION INTRAMUSCULAR; INTRAVENOUS PRN
Status: DISCONTINUED | OUTPATIENT
Start: 2019-09-27 | End: 2019-09-27 | Stop reason: SDUPTHER

## 2019-09-27 RX ORDER — 0.9 % SODIUM CHLORIDE 0.9 %
250 INTRAVENOUS SOLUTION INTRAVENOUS ONCE
Status: DISCONTINUED | OUTPATIENT
Start: 2019-09-27 | End: 2019-09-27

## 2019-09-27 RX ORDER — EPHEDRINE SULFATE/0.9% NACL/PF 50 MG/5 ML
SYRINGE (ML) INTRAVENOUS PRN
Status: DISCONTINUED | OUTPATIENT
Start: 2019-09-27 | End: 2019-09-27 | Stop reason: SDUPTHER

## 2019-09-27 RX ORDER — SODIUM CHLORIDE 0.9 % (FLUSH) 0.9 %
10 SYRINGE (ML) INJECTION EVERY 12 HOURS SCHEDULED
Status: DISCONTINUED | OUTPATIENT
Start: 2019-09-27 | End: 2019-09-27 | Stop reason: SDUPTHER

## 2019-09-27 RX ORDER — DEXAMETHASONE SODIUM PHOSPHATE 4 MG/ML
INJECTION, SOLUTION INTRA-ARTICULAR; INTRALESIONAL; INTRAMUSCULAR; INTRAVENOUS; SOFT TISSUE PRN
Status: DISCONTINUED | OUTPATIENT
Start: 2019-09-27 | End: 2019-09-27 | Stop reason: SDUPTHER

## 2019-09-27 RX ORDER — LIDOCAINE HYDROCHLORIDE 10 MG/ML
INJECTION, SOLUTION EPIDURAL; INFILTRATION; INTRACAUDAL; PERINEURAL PRN
Status: DISCONTINUED | OUTPATIENT
Start: 2019-09-27 | End: 2019-09-27 | Stop reason: SDUPTHER

## 2019-09-27 RX ORDER — LORAZEPAM 0.5 MG/1
0.5 TABLET ORAL NIGHTLY PRN
Status: DISCONTINUED | OUTPATIENT
Start: 2019-09-27 | End: 2019-09-28 | Stop reason: HOSPADM

## 2019-09-27 RX ORDER — KETAMINE HCL IN NACL, ISO-OSM 100MG/10ML
SYRINGE (ML) INJECTION PRN
Status: DISCONTINUED | OUTPATIENT
Start: 2019-09-27 | End: 2019-09-27 | Stop reason: SDUPTHER

## 2019-09-27 RX ORDER — PROPOFOL 10 MG/ML
INJECTION, EMULSION INTRAVENOUS PRN
Status: DISCONTINUED | OUTPATIENT
Start: 2019-09-27 | End: 2019-09-27 | Stop reason: SDUPTHER

## 2019-09-27 RX ORDER — SUCCINYLCHOLINE/SOD CL,ISO/PF 200MG/10ML
SYRINGE (ML) INTRAVENOUS PRN
Status: DISCONTINUED | OUTPATIENT
Start: 2019-09-27 | End: 2019-09-27 | Stop reason: SDUPTHER

## 2019-09-27 RX ORDER — VECURONIUM BROMIDE 1 MG/ML
INJECTION, POWDER, LYOPHILIZED, FOR SOLUTION INTRAVENOUS PRN
Status: DISCONTINUED | OUTPATIENT
Start: 2019-09-27 | End: 2019-09-27 | Stop reason: SDUPTHER

## 2019-09-27 RX ORDER — FENTANYL CITRATE 50 UG/ML
25 INJECTION, SOLUTION INTRAMUSCULAR; INTRAVENOUS EVERY 5 MIN PRN
Status: DISCONTINUED | OUTPATIENT
Start: 2019-09-27 | End: 2019-09-27

## 2019-09-27 RX ORDER — PHENYLEPHRINE HCL IN 0.9% NACL 1 MG/10 ML
SYRINGE (ML) INTRAVENOUS PRN
Status: DISCONTINUED | OUTPATIENT
Start: 2019-09-27 | End: 2019-09-27 | Stop reason: SDUPTHER

## 2019-09-27 RX ORDER — SODIUM CHLORIDE 0.9 % (FLUSH) 0.9 %
10 SYRINGE (ML) INJECTION PRN
Status: DISCONTINUED | OUTPATIENT
Start: 2019-09-27 | End: 2019-09-28 | Stop reason: HOSPADM

## 2019-09-27 RX ORDER — GLYCOPYRROLATE 0.2 MG/ML
INJECTION INTRAMUSCULAR; INTRAVENOUS PRN
Status: DISCONTINUED | OUTPATIENT
Start: 2019-09-27 | End: 2019-09-27 | Stop reason: SDUPTHER

## 2019-09-27 RX ORDER — METHYLPREDNISOLONE SODIUM SUCCINATE 125 MG/2ML
INJECTION, POWDER, LYOPHILIZED, FOR SOLUTION INTRAMUSCULAR; INTRAVENOUS PRN
Status: DISCONTINUED | OUTPATIENT
Start: 2019-09-27 | End: 2019-09-27 | Stop reason: SDUPTHER

## 2019-09-27 RX ORDER — PAROXETINE 10 MG/1
10 TABLET, FILM COATED ORAL DAILY
Status: DISCONTINUED | OUTPATIENT
Start: 2019-09-27 | End: 2019-09-28 | Stop reason: HOSPADM

## 2019-09-27 RX ORDER — HYDROCHLOROTHIAZIDE 12.5 MG/1
12.5 CAPSULE, GELATIN COATED ORAL DAILY
Status: DISCONTINUED | OUTPATIENT
Start: 2019-09-27 | End: 2019-09-28 | Stop reason: HOSPADM

## 2019-09-27 RX ORDER — SODIUM CHLORIDE 0.9 % (FLUSH) 0.9 %
10 SYRINGE (ML) INJECTION EVERY 12 HOURS SCHEDULED
Status: DISCONTINUED | OUTPATIENT
Start: 2019-09-27 | End: 2019-09-27

## 2019-09-27 RX ORDER — SODIUM CHLORIDE 0.9 % (FLUSH) 0.9 %
10 SYRINGE (ML) INJECTION PRN
Status: DISCONTINUED | OUTPATIENT
Start: 2019-09-27 | End: 2019-09-27

## 2019-09-27 RX ORDER — ATROPINE SULFATE 0.1 MG/ML
INJECTION INTRAVENOUS
Status: DISPENSED
Start: 2019-09-27 | End: 2019-09-28

## 2019-09-27 RX ORDER — SODIUM CHLORIDE 0.9 % (FLUSH) 0.9 %
10 SYRINGE (ML) INJECTION PRN
Status: DISCONTINUED | OUTPATIENT
Start: 2019-09-27 | End: 2019-09-27 | Stop reason: SDUPTHER

## 2019-09-27 RX ORDER — SODIUM CHLORIDE 9 MG/ML
INJECTION, SOLUTION INTRAVENOUS ONCE
Status: COMPLETED | OUTPATIENT
Start: 2019-09-27 | End: 2019-09-27

## 2019-09-27 RX ORDER — MORPHINE SULFATE 2 MG/ML
2 INJECTION, SOLUTION INTRAMUSCULAR; INTRAVENOUS EVERY 4 HOURS PRN
Status: DISCONTINUED | OUTPATIENT
Start: 2019-09-27 | End: 2019-09-28 | Stop reason: HOSPADM

## 2019-09-27 RX ORDER — ALBUMIN, HUMAN INJ 5% 5 %
SOLUTION INTRAVENOUS PRN
Status: DISCONTINUED | OUTPATIENT
Start: 2019-09-27 | End: 2019-09-27 | Stop reason: SDUPTHER

## 2019-09-27 RX ORDER — POTASSIUM CHLORIDE 750 MG/1
10 TABLET, FILM COATED, EXTENDED RELEASE ORAL DAILY
Status: DISCONTINUED | OUTPATIENT
Start: 2019-09-27 | End: 2019-09-28 | Stop reason: HOSPADM

## 2019-09-27 RX ORDER — DIPHENHYDRAMINE HYDROCHLORIDE 50 MG/ML
INJECTION INTRAMUSCULAR; INTRAVENOUS PRN
Status: DISCONTINUED | OUTPATIENT
Start: 2019-09-27 | End: 2019-09-27 | Stop reason: SDUPTHER

## 2019-09-27 RX ORDER — ACETAMINOPHEN 325 MG/1
650 TABLET ORAL EVERY 4 HOURS PRN
Status: DISCONTINUED | OUTPATIENT
Start: 2019-09-27 | End: 2019-09-28 | Stop reason: HOSPADM

## 2019-09-27 RX ORDER — PROMETHAZINE HYDROCHLORIDE 25 MG/ML
6.25 INJECTION, SOLUTION INTRAMUSCULAR; INTRAVENOUS EVERY 6 HOURS PRN
Status: DISCONTINUED | OUTPATIENT
Start: 2019-09-27 | End: 2019-09-27

## 2019-09-27 RX ORDER — SODIUM CHLORIDE 0.9 % (FLUSH) 0.9 %
10 SYRINGE (ML) INJECTION EVERY 12 HOURS SCHEDULED
Status: DISCONTINUED | OUTPATIENT
Start: 2019-09-27 | End: 2019-09-28 | Stop reason: HOSPADM

## 2019-09-27 RX ORDER — LISINOPRIL 10 MG/1
10 TABLET ORAL DAILY
Status: DISCONTINUED | OUTPATIENT
Start: 2019-09-27 | End: 2019-09-28 | Stop reason: HOSPADM

## 2019-09-27 RX ORDER — PROCHLORPERAZINE EDISYLATE 5 MG/ML
10 INJECTION INTRAMUSCULAR; INTRAVENOUS EVERY 6 HOURS PRN
Status: DISCONTINUED | OUTPATIENT
Start: 2019-09-27 | End: 2019-09-28 | Stop reason: HOSPADM

## 2019-09-27 RX ORDER — FENTANYL CITRATE 50 UG/ML
INJECTION, SOLUTION INTRAMUSCULAR; INTRAVENOUS PRN
Status: DISCONTINUED | OUTPATIENT
Start: 2019-09-27 | End: 2019-09-27 | Stop reason: SDUPTHER

## 2019-09-27 RX ORDER — LISINOPRIL AND HYDROCHLOROTHIAZIDE 12.5; 1 MG/1; MG/1
1 TABLET ORAL DAILY
Status: DISCONTINUED | OUTPATIENT
Start: 2019-09-27 | End: 2019-09-27 | Stop reason: CLARIF

## 2019-09-27 RX ORDER — MIDAZOLAM HYDROCHLORIDE 1 MG/ML
INJECTION INTRAMUSCULAR; INTRAVENOUS PRN
Status: DISCONTINUED | OUTPATIENT
Start: 2019-09-27 | End: 2019-09-27 | Stop reason: SDUPTHER

## 2019-09-27 RX ORDER — OXYCODONE HYDROCHLORIDE AND ACETAMINOPHEN 5; 325 MG/1; MG/1
1 TABLET ORAL PRN
Status: DISCONTINUED | OUTPATIENT
Start: 2019-09-27 | End: 2019-09-27

## 2019-09-27 RX ORDER — HEPARIN SODIUM 10000 [USP'U]/100ML
INJECTION, SOLUTION INTRAVENOUS CONTINUOUS PRN
Status: DISCONTINUED | OUTPATIENT
Start: 2019-09-27 | End: 2019-09-27 | Stop reason: SDUPTHER

## 2019-09-27 RX ORDER — HEPARIN SODIUM 1000 [USP'U]/ML
INJECTION, SOLUTION INTRAVENOUS; SUBCUTANEOUS PRN
Status: DISCONTINUED | OUTPATIENT
Start: 2019-09-27 | End: 2019-09-27 | Stop reason: SDUPTHER

## 2019-09-27 RX ORDER — 0.9 % SODIUM CHLORIDE 0.9 %
VIAL (ML) INJECTION PRN
Status: DISCONTINUED | OUTPATIENT
Start: 2019-09-27 | End: 2019-09-27 | Stop reason: SDUPTHER

## 2019-09-27 RX ORDER — SODIUM CHLORIDE 9 MG/ML
INJECTION, SOLUTION INTRAVENOUS CONTINUOUS
Status: DISCONTINUED | OUTPATIENT
Start: 2019-09-27 | End: 2019-09-27

## 2019-09-27 RX ORDER — POTASSIUM CHLORIDE 7.45 MG/ML
10 INJECTION INTRAVENOUS PRN
Status: DISCONTINUED | OUTPATIENT
Start: 2019-09-27 | End: 2019-09-28 | Stop reason: HOSPADM

## 2019-09-27 RX ORDER — ONDANSETRON 2 MG/ML
4 INJECTION INTRAMUSCULAR; INTRAVENOUS
Status: DISCONTINUED | OUTPATIENT
Start: 2019-09-27 | End: 2019-09-27

## 2019-09-27 RX ORDER — SODIUM CHLORIDE 9 MG/ML
INJECTION, SOLUTION INTRAVENOUS CONTINUOUS
Status: DISCONTINUED | OUTPATIENT
Start: 2019-09-27 | End: 2019-09-27 | Stop reason: SDUPTHER

## 2019-09-27 RX ORDER — OXYCODONE HYDROCHLORIDE AND ACETAMINOPHEN 5; 325 MG/1; MG/1
2 TABLET ORAL PRN
Status: DISCONTINUED | OUTPATIENT
Start: 2019-09-27 | End: 2019-09-27

## 2019-09-27 RX ADMIN — POTASSIUM CHLORIDE 40 MEQ: 20 TABLET, EXTENDED RELEASE ORAL at 17:35

## 2019-09-27 RX ADMIN — HEPARIN SODIUM 4000 UNITS: 1000 INJECTION, SOLUTION INTRAVENOUS; SUBCUTANEOUS at 11:19

## 2019-09-27 RX ADMIN — Medication 100 MCG: at 08:56

## 2019-09-27 RX ADMIN — Medication 100 MCG: at 11:51

## 2019-09-27 RX ADMIN — ALBUMIN (HUMAN) 25 G: 12.5 INJECTION, SOLUTION INTRAVENOUS at 08:54

## 2019-09-27 RX ADMIN — GLYCOPYRROLATE 0.2 MG: 0.2 INJECTION, SOLUTION INTRAMUSCULAR; INTRAVENOUS at 07:45

## 2019-09-27 RX ADMIN — FUROSEMIDE 40 MG: 10 INJECTION, SOLUTION INTRAVENOUS at 12:32

## 2019-09-27 RX ADMIN — LIDOCAINE HYDROCHLORIDE 50 MG: 10 INJECTION, SOLUTION EPIDURAL; INFILTRATION; INTRACAUDAL; PERINEURAL at 07:39

## 2019-09-27 RX ADMIN — PROCHLORPERAZINE EDISYLATE 10 MG: 5 INJECTION INTRAMUSCULAR; INTRAVENOUS at 16:53

## 2019-09-27 RX ADMIN — APIXABAN 5 MG: 5 TABLET, FILM COATED ORAL at 15:51

## 2019-09-27 RX ADMIN — POTASSIUM CHLORIDE 10 MEQ: 750 TABLET, EXTENDED RELEASE ORAL at 15:20

## 2019-09-27 RX ADMIN — SODIUM CHLORIDE: 9 INJECTION, SOLUTION INTRAVENOUS at 09:10

## 2019-09-27 RX ADMIN — DIPHENHYDRAMINE HYDROCHLORIDE 50 MG: 50 INJECTION, SOLUTION INTRAMUSCULAR; INTRAVENOUS at 12:53

## 2019-09-27 RX ADMIN — HEPARIN SODIUM 4000 UNITS: 1000 INJECTION, SOLUTION INTRAVENOUS; SUBCUTANEOUS at 11:13

## 2019-09-27 RX ADMIN — Medication 10 MG: at 08:02

## 2019-09-27 RX ADMIN — Medication 200 MCG: at 10:58

## 2019-09-27 RX ADMIN — DEXAMETHASONE SODIUM PHOSPHATE 8 MG: 4 INJECTION, SOLUTION INTRAMUSCULAR; INTRAVENOUS at 07:45

## 2019-09-27 RX ADMIN — Medication 200 MCG: at 08:32

## 2019-09-27 RX ADMIN — HEPARIN SODIUM AND DEXTROSE 7000 UNITS/HR: 10000; 5 INJECTION INTRAVENOUS at 11:41

## 2019-09-27 RX ADMIN — Medication 120 MG: at 07:39

## 2019-09-27 RX ADMIN — PROTAMINE SULFATE 50 MG: 10 INJECTION, SOLUTION INTRAVENOUS at 15:52

## 2019-09-27 RX ADMIN — IOPAMIDOL 75 ML: 755 INJECTION, SOLUTION INTRAVENOUS at 09:19

## 2019-09-27 RX ADMIN — SUGAMMADEX 200 MG: 100 INJECTION, SOLUTION INTRAVENOUS at 12:34

## 2019-09-27 RX ADMIN — MIDAZOLAM 2 MG: 1 INJECTION INTRAMUSCULAR; INTRAVENOUS at 07:35

## 2019-09-27 RX ADMIN — VECURONIUM BROMIDE 10 MG: 1 INJECTION, POWDER, LYOPHILIZED, FOR SOLUTION INTRAVENOUS at 07:43

## 2019-09-27 RX ADMIN — ATORVASTATIN CALCIUM 10 MG: 10 TABLET, FILM COATED ORAL at 15:20

## 2019-09-27 RX ADMIN — Medication 200 MCG: at 11:01

## 2019-09-27 RX ADMIN — SODIUM CHLORIDE 10 ML: 9 INJECTION INTRAMUSCULAR; INTRAVENOUS; SUBCUTANEOUS at 07:43

## 2019-09-27 RX ADMIN — Medication 30 MG: at 07:39

## 2019-09-27 RX ADMIN — SODIUM CHLORIDE: 9 INJECTION, SOLUTION INTRAVENOUS at 07:34

## 2019-09-27 RX ADMIN — FAMOTIDINE 20 MG: 10 INJECTION, SOLUTION INTRAVENOUS at 12:53

## 2019-09-27 RX ADMIN — Medication 20 MG: at 08:21

## 2019-09-27 RX ADMIN — Medication 20 MG: at 08:24

## 2019-09-27 RX ADMIN — Medication 200 MCG: at 08:46

## 2019-09-27 RX ADMIN — Medication 200 MCG: at 08:50

## 2019-09-27 RX ADMIN — Medication 300 MCG: at 11:06

## 2019-09-27 RX ADMIN — Medication 100 MCG: at 08:27

## 2019-09-27 RX ADMIN — HEPARIN SODIUM 2000 UNITS: 1000 INJECTION, SOLUTION INTRAVENOUS; SUBCUTANEOUS at 10:33

## 2019-09-27 RX ADMIN — Medication 200 MCG: at 08:53

## 2019-09-27 RX ADMIN — Medication 100 MCG: at 11:19

## 2019-09-27 RX ADMIN — Medication 100 MCG: at 11:55

## 2019-09-27 RX ADMIN — Medication 100 MCG: at 11:13

## 2019-09-27 RX ADMIN — VECURONIUM BROMIDE 10 MG: 1 INJECTION, POWDER, LYOPHILIZED, FOR SOLUTION INTRAVENOUS at 08:57

## 2019-09-27 RX ADMIN — METHYLPREDNISOLONE SODIUM SUCCINATE 125 MG: 125 INJECTION, POWDER, FOR SOLUTION INTRAMUSCULAR; INTRAVENOUS at 12:53

## 2019-09-27 RX ADMIN — PROPOFOL 100 MG: 10 INJECTION, EMULSION INTRAVENOUS at 07:39

## 2019-09-27 RX ADMIN — FENTANYL CITRATE 100 MCG: 50 INJECTION INTRAMUSCULAR; INTRAVENOUS at 07:36

## 2019-09-27 RX ADMIN — SODIUM CHLORIDE: 9 INJECTION, SOLUTION INTRAVENOUS at 16:35

## 2019-09-27 RX ADMIN — ONDANSETRON 4 MG: 2 INJECTION INTRAMUSCULAR; INTRAVENOUS at 07:45

## 2019-09-27 ASSESSMENT — PULMONARY FUNCTION TESTS
PIF_VALUE: 15
PIF_VALUE: 16
PIF_VALUE: 15
PIF_VALUE: 14
PIF_VALUE: 14
PIF_VALUE: 17
PIF_VALUE: 14
PIF_VALUE: 15
PIF_VALUE: 20
PIF_VALUE: 15
PIF_VALUE: 17
PIF_VALUE: 15
PIF_VALUE: 14
PIF_VALUE: 17
PIF_VALUE: 14
PIF_VALUE: 15
PIF_VALUE: 16
PIF_VALUE: 14
PIF_VALUE: 17
PIF_VALUE: 16
PIF_VALUE: 16
PIF_VALUE: 15
PIF_VALUE: 16
PIF_VALUE: 14
PIF_VALUE: 1
PIF_VALUE: 17
PIF_VALUE: 14
PIF_VALUE: 16
PIF_VALUE: 14
PIF_VALUE: 17
PIF_VALUE: 17
PIF_VALUE: 18
PIF_VALUE: 16
PIF_VALUE: 15
PIF_VALUE: 16
PIF_VALUE: 17
PIF_VALUE: 14
PIF_VALUE: 1
PIF_VALUE: 17
PIF_VALUE: 16
PIF_VALUE: 17
PIF_VALUE: 14
PIF_VALUE: 0
PIF_VALUE: 0
PIF_VALUE: 17
PIF_VALUE: 9
PIF_VALUE: 14
PIF_VALUE: 17
PIF_VALUE: 17
PIF_VALUE: 15
PIF_VALUE: 16
PIF_VALUE: 17
PIF_VALUE: 16
PIF_VALUE: 16
PIF_VALUE: 13
PIF_VALUE: 16
PIF_VALUE: 2
PIF_VALUE: 15
PIF_VALUE: 14
PIF_VALUE: 15
PIF_VALUE: 17
PIF_VALUE: 14
PIF_VALUE: 17
PIF_VALUE: 14
PIF_VALUE: 16
PIF_VALUE: 16
PIF_VALUE: 17
PIF_VALUE: 15
PIF_VALUE: 16
PIF_VALUE: 17
PIF_VALUE: 15
PIF_VALUE: 17
PIF_VALUE: 17
PIF_VALUE: 16
PIF_VALUE: 1
PIF_VALUE: 14
PIF_VALUE: 16
PIF_VALUE: 17
PIF_VALUE: 16
PIF_VALUE: 13
PIF_VALUE: 1
PIF_VALUE: 16
PIF_VALUE: 14
PIF_VALUE: 15
PIF_VALUE: 16
PIF_VALUE: 17
PIF_VALUE: 15
PIF_VALUE: 14
PIF_VALUE: 17
PIF_VALUE: 14
PIF_VALUE: 16
PIF_VALUE: 17
PIF_VALUE: 0
PIF_VALUE: 15
PIF_VALUE: 17
PIF_VALUE: 4
PIF_VALUE: 17
PIF_VALUE: 17
PIF_VALUE: 16
PIF_VALUE: 17
PIF_VALUE: 17
PIF_VALUE: 15
PIF_VALUE: 17
PIF_VALUE: 14
PIF_VALUE: 16
PIF_VALUE: 17
PIF_VALUE: 16
PIF_VALUE: 4
PIF_VALUE: 17
PIF_VALUE: 15
PIF_VALUE: 16
PIF_VALUE: 15
PIF_VALUE: 17
PIF_VALUE: 37
PIF_VALUE: 17
PIF_VALUE: 16
PIF_VALUE: 16
PIF_VALUE: 17
PIF_VALUE: 16
PIF_VALUE: 15
PIF_VALUE: 17
PIF_VALUE: 16
PIF_VALUE: 17
PIF_VALUE: 17
PIF_VALUE: 18
PIF_VALUE: 14
PIF_VALUE: 0
PIF_VALUE: 0
PIF_VALUE: 17
PIF_VALUE: 14
PIF_VALUE: 17
PIF_VALUE: 17
PIF_VALUE: 16
PIF_VALUE: 15
PIF_VALUE: 15
PIF_VALUE: 16
PIF_VALUE: 16
PIF_VALUE: 15
PIF_VALUE: 16
PIF_VALUE: 17
PIF_VALUE: 15
PIF_VALUE: 15
PIF_VALUE: 16
PIF_VALUE: 17
PIF_VALUE: 17
PIF_VALUE: 16
PIF_VALUE: 15
PIF_VALUE: 16
PIF_VALUE: 17
PIF_VALUE: 16
PIF_VALUE: 16
PIF_VALUE: 17
PIF_VALUE: 16
PIF_VALUE: 16
PIF_VALUE: 1
PIF_VALUE: 16
PIF_VALUE: 15
PIF_VALUE: 17
PIF_VALUE: 16
PIF_VALUE: 1
PIF_VALUE: 14
PIF_VALUE: 17
PIF_VALUE: 14
PIF_VALUE: 17
PIF_VALUE: 15
PIF_VALUE: 16
PIF_VALUE: 1
PIF_VALUE: 16
PIF_VALUE: 16
PIF_VALUE: 0
PIF_VALUE: 17
PIF_VALUE: 17
PIF_VALUE: 15
PIF_VALUE: 17
PIF_VALUE: 15
PIF_VALUE: 16
PIF_VALUE: 16
PIF_VALUE: 17
PIF_VALUE: 37
PIF_VALUE: 15
PIF_VALUE: 15
PIF_VALUE: 17
PIF_VALUE: 17
PIF_VALUE: 15
PIF_VALUE: 17
PIF_VALUE: 16
PIF_VALUE: 16
PIF_VALUE: 14
PIF_VALUE: 14
PIF_VALUE: 17
PIF_VALUE: 17
PIF_VALUE: 14
PIF_VALUE: 16
PIF_VALUE: 0
PIF_VALUE: 19
PIF_VALUE: 16
PIF_VALUE: 17
PIF_VALUE: 17
PIF_VALUE: 15
PIF_VALUE: 17
PIF_VALUE: 15
PIF_VALUE: 17
PIF_VALUE: 14
PIF_VALUE: 15
PIF_VALUE: 14
PIF_VALUE: 15
PIF_VALUE: 16
PIF_VALUE: 17
PIF_VALUE: 17
PIF_VALUE: 15
PIF_VALUE: 16
PIF_VALUE: 15
PIF_VALUE: 15
PIF_VALUE: 14
PIF_VALUE: 15
PIF_VALUE: 15
PIF_VALUE: 0
PIF_VALUE: 17
PIF_VALUE: 14
PIF_VALUE: 16
PIF_VALUE: 17
PIF_VALUE: 0
PIF_VALUE: 14
PIF_VALUE: 16
PIF_VALUE: 16
PIF_VALUE: 17
PIF_VALUE: 15
PIF_VALUE: 16
PIF_VALUE: 15
PIF_VALUE: 16
PIF_VALUE: 17
PIF_VALUE: 0
PIF_VALUE: 15
PIF_VALUE: 1
PIF_VALUE: 14
PIF_VALUE: 16
PIF_VALUE: 17
PIF_VALUE: 16
PIF_VALUE: 17
PIF_VALUE: 14
PIF_VALUE: 15

## 2019-09-27 ASSESSMENT — PAIN SCALES - GENERAL
PAINLEVEL_OUTOF10: 0

## 2019-09-27 NOTE — ANESTHESIA POSTPROCEDURE EVALUATION
Department of Anesthesiology  Postprocedure Note    Patient: Ryne Novak  MRN: [id number]  YOB: 1940  Date of evaluation: 9/27/2019  Time:  1:33 PM     Procedure Summary     Date:  09/27/19 Room / Location:  [address] [place] [address] Cath Lab    Anesthesia Start:  1012 Anesthesia Stop:  7117    Procedure:  MARTINEZ AFLUTTER ABLATION W/ ANES Diagnosis:  A-fib ([facility] [address]    Scheduled Providers:   Responsible Provider:  Saad Rothman MD    Anesthesia Type:  general ASA Status:  3          Anesthesia Type: general    Len Phase I:      Len Phase II:      Last vitals: Reviewed and per EMR flowsheets.        Anesthesia Post Evaluation    Patient location during evaluation: bedside  Patient participation: complete - patient participated  Level of consciousness: awake  Pain score: 1  Airway patency: patent  Nausea & Vomiting: no nausea and no vomiting  Complications: no  Cardiovascular status: blood pressure returned to baseline  Respiratory status: acceptable  Hydration status: euvolemic

## 2019-09-27 NOTE — H&P
[address]   Cardiac Electrophysiology Consultation   Date: 9/27/2019  Reason for Consultation: Afib  Consult Requesting Physician: Miguelangel Castellanos MD  Primary Care Physician: Lou Flores MD     Chief Complaint:        Chief Complaint   Patient presents with    Follow-up       Afib         HPI: Brian Pretty is a 66 y.o. with a PMH significant for HTN and paroxysmal afib (dating back to at least 2009) who is typically followed by Dr. Samy Calzada for his cardiac needs. He was seen in office for initial consultation on 10/23/19 to discuss treatment options for his symptomatic paroxysmal Afib. He had been on Amiodarone since 2017 and was interested in perusing invasive therapy versus medical management. Atrial fibrillation  ablation was discuss and patient decided to proceed and subsequently underwent Afib ablation on 2/20/19.     Interval history: Today, patient present for follow up on the results of his 30 day event monitor results which showed one episode of atrial flutter s/p afib ablation 2/20/19. He is compliant with his medications and tolerating them well. He  denies chest pain/pressure, tightness, edema, shortness of breath, heart racing, lightheadedness, dizziness, syncope, presyncope,  PND or orthopnea.      Past Medical History        Past Medical History:   Diagnosis Date    Atrial fibrillation (HCC)      Back pain      Cancer ([address]       prostate    Hyperlipidemia      Hypertension              Past Surgical History         Past Surgical History:   Procedure Laterality Date    BACK SURGERY        COLONOSCOPY   12/30/2016     Dr Queta Cherry                Allergies:  No Known Allergies     Medication:   Home Medications           Prior to Admission medications    Medication Sig Start Date End Date Taking? Authorizing Provider   temazepam (RESTORIL) 30 MG capsule Take 30 mg by mouth nightly. . 2/4/19   Yes Historical Provider, MD oxyCODONE-acetaminophen (PERCOCET) 5-325 MG per tablet Take 1 tablet by mouth every 8 hours as needed for Pain. .     Yes Historical Provider, MD   PARoxetine (PAXIL) 10 MG tablet Take 1 tablet by mouth daily 3/6/19   Yes Deonna High MD   ELIQUIS 5 MG TABS tablet TAKE ONE TABLET BY MOUTH TWICE A DAY 1/8/19   Yes Irma Tapia MD   lisinopril-hydrochlorothiazide (PRINZIDE;ZESTORETIC) 10-12.5 MG per tablet Take 1 tablet by mouth daily 1/4/17   Yes Irma Tapia MD   atorvastatin (LIPITOR) 10 MG tablet Take 1 tablet by mouth daily 1/4/17   Yes Irma Tapia MD   potassium chloride (KLOR-CON 10) 10 MEQ extended release tablet Take 1 tablet by mouth daily 1/4/17   Yes Irma Tapia MD   vitamin D (ERGOCALCIFEROL) 70204 UNITS CAPS capsule Take 50,000 Units by mouth once a week     Yes Historical Provider, MD   PARoxetine (PAXIL) 30 MG tablet Take 30 mg by mouth daily 2/22/19 2/26/19   Historical Provider, MD   PARoxetine (PAXIL) 20 MG tablet Take 20 mg by mouth daily 2/27/19 3/5/19   Historical Provider, MD            Social History:   reports that he quit smoking about 33 years ago. He quit after 12.00 years of use. He has never used smokeless tobacco. He reports that he does not drink alcohol or use drugs.         Family History:  family history includes Colon Cancer in his brother. Reviewed.  Denies family history of sudden cardiac death, arrhythmia, premature CAD     Review of System:     · General ROS: negative for - chills, fever   · Psychological ROS: negative for - anxiety or depression  · Ophthalmic ROS: negative for - eye pain or loss of vision  · ENT ROS: negative for - epistaxis, headaches, nasal discharge, sore throat   · Allergy and Immunology ROS: negative for - hives, nasal congestion   · Hematological and Lymphatic ROS: negative for - bleeding problems, blood clots, bruising or jaundice  · Endocrine ROS: negative for - skin changes, temperature intolerance or unexpected weight changes  · Respiratory ROS:

## 2019-09-27 NOTE — PROCEDURES
[facility]     Electrophysiology Procedure Note       Date of Procedure: 9/27/2019  Patient's Name: Marcia Wadsworth  YOB: 1940   Medical Record Number: [id number]  Referring Physician: Yanira Escalante  Procedure Performed by: Mateus Gu MD    Procedure performed:     · Electrophysiology study with radiofrequency ablation of left atrial flutter and atrial fibrillation with roof and mitral isthmus ablation   · 3-D electroanatomical mapping of the left atrium    · Transseptal puncture through an intact septum x 2 under intracardiac ultrasound without fluoroscopic guidance   · Intracardiac echocardiography. · Anesthesia: General anesthesia provided by the Anesthesia service  · (there were no independent trained observers who had no other duties involved in this procedure)      Indications for procedure: Symptomatic atrial flutter (presumably arising from Left Atrium), failed antiarrhythmic therapy. Marcia Wadsworth is a 66 y.o. male who has a history of paroxysmal/persistent atrial fibrillation s/p ablation in early 2019 who is symptomatic with symptoms of dyspnea with minimal exertion and fatigue with paroxysmal atrial flutter subsequent to his atrial fibrillation ablation. He is now here for an ablation for paroxysmal left atrial flutter. Details of Procedure: The risks, benefits and alternatives of the ablation procedure were discussed with the patient. The risks including, but not limited to, the risks of bleeding, infection, radiation exposure, injury to vascular, cardiac and surrounding structures (including pneumothorax), stroke, cardiac perforation, tamponade, need for emergent open heart surgery, need for pacemaker implantation, esophageal injury and fistula, myocardial infarction and death were discussed in detail. The patient opted to proceed with the ablation. Written informed consent was signed and placed in the chart.      Patient was brought to the EP lab in a fasting and to attempt to induce atrial fibrillation. We had adequate adenosine effect (AV blocks of > 7 seconds) and there were no pulmonary fascicles seen in any of the veins nor were there any atrial tachydysrhythmia induced. We then performed an EP study and programmed stimulation using our CS catheter and ablation cathter to assess the cardiac conduction system and to attempt to induce atrial tachydysrhythmia. The ablation catheter were moved from the left atrium to the left ventricle and His bundle position. His bundle potentials was recorded and pacing was performed from right atrium, coronary sinus and LV apex with the following results:     Sinus cycle length was 1220 msec  VT interval was 151 msec  QRS duration was 88 msec  QT interval was 511 msec  AH interval was 97 msec  HV interval was 46 msec  Pacing from left atrium, 1:1 conduction over AV node with (AV wenckebach) was 350  msec  Pacing from left atrium, AV karli ERP was 600/340 msec   Pacing from LV apex, 1:1 retrograde conduction over AV node (VA wenckebach) was 550 msec  Pacing from LV apex, showed VAERP of 600/290 msec. Then both the ablation, Pentarray, and CS catheters were removed from the body, and all 3 sheaths were removed from the right femoral vein with a figure-of-eight suture that was placed to provide hemostasis while awaiting the downtrending of the ACT. Under intracardiac ultrasound guidance, we evaluated for pericardail effusion, and there was no evidence of such. Specimen collected: none    Estimated blood loss: < 50 cc    The patient tolerated the procedure well and there were no complications. Patient was extubated and transferred to the floor in stable condition.      Conclusion:     - Pre- and post-procedure diagnoses were persistent atrial fibrillation and paroxysmal left atrial flutter with cycle length 226ms with a [id number] CS atrial activation   -abilio-procedural hypovolemic shock due to dehydration, requiring

## 2019-09-27 NOTE — ANESTHESIA PRE PROCEDURE
LABGLOM >60 09/20/2019    GLUCOSE 103 09/20/2019    PROT 7.0 04/07/2016    CALCIUM 9.2 09/20/2019    BILITOT 0.5 04/07/2016    ALKPHOS 46 04/07/2016    AST 17 04/07/2016    ALT 17 04/07/2016     BMP    Lab Results   Component Value Date     09/20/2019    K 3.8 09/20/2019    CL 92 09/20/2019    CO2 26 09/20/2019    BUN 7 09/20/2019    CREATININE 1.0 09/20/2019    CALCIUM 9.2 09/20/2019    GFRAA >60 09/20/2019    LABGLOM >60 09/20/2019    GLUCOSE 103 09/20/2019     POCGlucose  No results for input(s): GLUCOSE in the last 72 hours. Coags    Lab Results   Component Value Date    PROTIME 15.9 02/14/2019    INR 1.39 33/70/8653     HCG (If Applicable) No results found for: PREGTESTUR, PREGSERUM, HCG, HCGQUANT   ABGs No results found for: PHART, PO2ART, YOP8RWY, TRT0CAU, BEART, F1QDTPRC   Type & Screen (If Applicable)  No results found for: LABABO, LABRH                         BMI: Wt Readings from Last 3 Encounters:       NPO Status:8 hours                        Anesthesia Evaluation  Patient summary reviewed no history of anesthetic complications:   Airway: Mallampati: III  TM distance: >3 FB   Neck ROM: full   Dental:    (+) partials      Pulmonary:Negative Pulmonary ROS and normal exam                               Cardiovascular:  Exercise tolerance: poor (<4 METS),   (+) hypertension (EF 60):, dysrhythmias (eliquis last dose Sunday): atrial fibrillation,       ECG reviewed  Rhythm: irregular  Rate: normal  Echocardiogram reviewed         Beta Blocker:  Not on Beta Blocker         Neuro/Psych:   Negative Neuro/Psych ROS              GI/Hepatic/Renal: Neg GI/Hepatic/Renal ROS            Endo/Other:    (+) blood dyscrasia: anticoagulation therapy:., .    (-) diabetes mellitus               Abdominal:           Vascular: negative vascular ROS. Anesthesia Plan      general     ASA 3     (Son present)  Induction: intravenous.     MIPS: Postoperative opioids intended and

## 2019-09-28 VITALS
BODY MASS INDEX: 28.05 KG/M2 | HEIGHT: 69 IN | RESPIRATION RATE: 18 BRPM | SYSTOLIC BLOOD PRESSURE: 133 MMHG | HEART RATE: 77 BPM | DIASTOLIC BLOOD PRESSURE: 69 MMHG | OXYGEN SATURATION: 96 % | WEIGHT: 189.38 LBS | TEMPERATURE: 98 F

## 2019-09-28 PROBLEM — I95.89 HYPOTENSION DUE TO HYPOVOLEMIA: Status: ACTIVE | Noted: 2019-09-28

## 2019-09-28 PROBLEM — E86.1 HYPOTENSION DUE TO HYPOVOLEMIA: Status: ACTIVE | Noted: 2019-09-28

## 2019-09-28 LAB
ANION GAP SERPL CALCULATED.3IONS-SCNC: 16 MMOL/L (ref 3–16)
BUN BLDV-MCNC: 12 MG/DL (ref 7–20)
CALCIUM SERPL-MCNC: 8 MG/DL (ref 8.3–10.6)
CHLORIDE BLD-SCNC: 105 MMOL/L (ref 99–110)
CO2: 20 MMOL/L (ref 21–32)
CREAT SERPL-MCNC: 1.1 MG/DL (ref 0.8–1.3)
GFR AFRICAN AMERICAN: >60
GFR NON-AFRICAN AMERICAN: >60
GLUCOSE BLD-MCNC: 141 MG/DL (ref 70–99)
HCT VFR BLD CALC: 34.6 % (ref 40.5–52.5)
HEMOGLOBIN: 11.7 G/DL (ref 13.5–17.5)
MCH RBC QN AUTO: 34.8 PG (ref 26–34)
MCHC RBC AUTO-ENTMCNC: 33.7 G/DL (ref 31–36)
MCV RBC AUTO: 103.2 FL (ref 80–100)
PDW BLD-RTO: 13.5 % (ref 12.4–15.4)
PLATELET # BLD: 158 K/UL (ref 135–450)
PMV BLD AUTO: 8 FL (ref 5–10.5)
POTASSIUM SERPL-SCNC: 4.3 MMOL/L (ref 3.5–5.1)
RBC # BLD: 3.35 M/UL (ref 4.2–5.9)
SODIUM BLD-SCNC: 141 MMOL/L (ref 136–145)
WBC # BLD: 10.2 K/UL (ref 4–11)

## 2019-09-28 PROCEDURE — 36415 COLL VENOUS BLD VENIPUNCTURE: CPT

## 2019-09-28 PROCEDURE — 85027 COMPLETE CBC AUTOMATED: CPT

## 2019-09-28 PROCEDURE — 6370000000 HC RX 637 (ALT 250 FOR IP): Performed by: INTERNAL MEDICINE

## 2019-09-28 PROCEDURE — 80048 BASIC METABOLIC PNL TOTAL CA: CPT

## 2019-09-28 PROCEDURE — 99239 HOSP IP/OBS DSCHRG MGMT >30: CPT | Performed by: INTERNAL MEDICINE

## 2019-09-28 RX ORDER — FLECAINIDE ACETATE 100 MG/1
50 TABLET ORAL 2 TIMES DAILY
Status: DISCONTINUED | OUTPATIENT
Start: 2019-09-28 | End: 2019-09-28 | Stop reason: HOSPADM

## 2019-09-28 RX ORDER — FLECAINIDE ACETATE 50 MG/1
50 TABLET ORAL 2 TIMES DAILY
Qty: 60 TABLET | Refills: 0 | Status: SHIPPED | OUTPATIENT
Start: 2019-09-28 | End: 2019-12-18 | Stop reason: ALTCHOICE

## 2019-09-28 RX ORDER — HYDROCHLOROTHIAZIDE 12.5 MG/1
12.5 CAPSULE, GELATIN COATED ORAL DAILY
Qty: 30 CAPSULE | Refills: 0 | Status: SHIPPED | OUTPATIENT
Start: 2019-09-29 | End: 2019-10-28 | Stop reason: SDUPTHER

## 2019-09-28 RX ORDER — OXYCODONE HYDROCHLORIDE AND ACETAMINOPHEN 5; 325 MG/1; MG/1
1 TABLET ORAL EVERY 8 HOURS PRN
COMMUNITY

## 2019-09-28 RX ADMIN — HYDROCHLOROTHIAZIDE 12.5 MG: 12.5 CAPSULE ORAL at 08:56

## 2019-09-28 RX ADMIN — APIXABAN 5 MG: 5 TABLET, FILM COATED ORAL at 06:06

## 2019-09-28 RX ADMIN — ATORVASTATIN CALCIUM 10 MG: 10 TABLET, FILM COATED ORAL at 08:55

## 2019-09-28 RX ADMIN — POTASSIUM CHLORIDE 10 MEQ: 750 TABLET, EXTENDED RELEASE ORAL at 08:55

## 2019-09-28 RX ADMIN — FLECAINIDE ACETATE 50 MG: 100 TABLET ORAL at 13:58

## 2019-09-28 RX ADMIN — METOPROLOL TARTRATE 12.5 MG: 25 TABLET ORAL at 13:58

## 2019-09-28 RX ADMIN — PAROXETINE HYDROCHLORIDE 10 MG: 10 TABLET, FILM COATED ORAL at 08:55

## 2019-09-28 RX ADMIN — LISINOPRIL 10 MG: 10 TABLET ORAL at 08:55

## 2019-09-28 ASSESSMENT — PAIN SCALES - GENERAL
PAINLEVEL_OUTOF10: 0

## 2019-09-28 NOTE — PROGRESS NOTES
9/27/19 1914 Handoff from 1441 [facility] RN, pt I bed, s/p Left Atrium ablation,Artrial sheath transduced, 9100'/60, Pt has been bolused 2l NS since back from cathlab  Per shift report from Joey Fernandez, [address] [address] to pull Arterial sheath per DR. Davis.  2152 Pt updated that we are about to pull Arterial sheath, explained procedure, acknowledged understanding, Femoral Pulse marked, Pedal pulse site marked, Atropine at bedside, 3 Nurses in room since had episodes of hypotension. @ 2152  /64 HR , BP cyled Q 5minutes    @2156 Sheath Pulled per Roberta Ramachandran RN unders observation of this Nurse and Charge Nurse Jillian Sandoval RN.manual pressure applied for 20minutes    @2202 BP 82/56 HR @ 85, opened NS IVF to 250ml/hr    @2216 Hemostasis achieved, groin soft, pedal pulses palpable, denies any Groin pain or numbness in legs or feet. New dsg applied, pt reminded again to keep head flat on pillow, to continue to monitor site and  bedrest for next 4hrs. er protocol    2230 BP 99/61, HR 74, IVF to [facility], Groin Site remain soft, no hematoma, no bleeding, no swelling noted. 9/28/19  0215 It's been 4hrs post sheath pull, pt has been compliant with bedrest leg straight, HOB now up 30 for comfort, R Groin remains soft and intact, no hematoma, no bleed. Latoya Rapp     0600 Pt stable, denies pain at Groin site, or any other pain, to keep monitoring, AM labs sent, H&H stable,  0730 Handoff to Elena Yoder. ROVERTO

## 2019-09-28 NOTE — DISCHARGE SUMMARY
Home Medication Instructions NMF:[id number]    Printed on:09/28/19 1221   Medication Information                      atorvastatin (LIPITOR) 10 MG tablet  Take 1 tablet by mouth daily             ELIQUIS 5 MG TABS tablet  TAKE ONE TABLET BY MOUTH TWICE A DAY             lisinopril-hydrochlorothiazide (PRINZIDE;ZESTORETIC) 10-12.5 MG per tablet  Take 1 tablet by mouth daily             oxyCODONE-acetaminophen (PERCOCET) 5-325 MG per tablet  Take 1 tablet by mouth every 8 hours as needed for Pain. PARoxetine (PAXIL) 20 MG tablet  Take 20 mg by mouth daily             temazepam (RESTORIL) 30 MG capsule  Take 30 mg by mouth nightly. .             vitamin D (ERGOCALCIFEROL) 46239 UNITS CAPS capsule  Take 50,000 Units by mouth once a week                                         Discharge Summary as above.     Attending Physician:   Nancy Vee MD, 9/28/2019, 12:21 PM

## 2019-09-30 ENCOUNTER — TELEPHONE (OUTPATIENT)
Dept: CARDIOLOGY CLINIC | Age: 79
End: 2019-09-30

## 2019-10-08 ENCOUNTER — OFFICE VISIT (OUTPATIENT)
Dept: CARDIOLOGY CLINIC | Age: 79
End: 2019-10-08
Payer: MEDICARE

## 2019-10-08 VITALS
BODY MASS INDEX: 26.92 KG/M2 | HEIGHT: 70 IN | OXYGEN SATURATION: 98 % | HEART RATE: 60 BPM | WEIGHT: 188 LBS | DIASTOLIC BLOOD PRESSURE: 68 MMHG | SYSTOLIC BLOOD PRESSURE: 120 MMHG

## 2019-10-08 DIAGNOSIS — I10 ESSENTIAL HYPERTENSION: ICD-10-CM

## 2019-10-08 DIAGNOSIS — I48.0 PAROXYSMAL ATRIAL FIBRILLATION (HCC): Primary | ICD-10-CM

## 2019-10-08 PROCEDURE — 99213 OFFICE O/P EST LOW 20 MIN: CPT | Performed by: NURSE PRACTITIONER

## 2019-10-08 PROCEDURE — 93000 ELECTROCARDIOGRAM COMPLETE: CPT | Performed by: NURSE PRACTITIONER

## 2019-10-29 RX ORDER — HYDROCHLOROTHIAZIDE 12.5 MG/1
CAPSULE, GELATIN COATED ORAL
Qty: 30 CAPSULE | Refills: 5 | Status: ON HOLD | OUTPATIENT
Start: 2019-10-29 | End: 2019-12-28 | Stop reason: HOSPADM

## 2019-12-09 DIAGNOSIS — I48.0 PAROXYSMAL ATRIAL FIBRILLATION (HCC): Primary | ICD-10-CM

## 2019-12-18 ENCOUNTER — OFFICE VISIT (OUTPATIENT)
Dept: CARDIOLOGY CLINIC | Age: 79
End: 2019-12-18
Payer: MEDICARE

## 2019-12-18 VITALS
HEART RATE: 62 BPM | OXYGEN SATURATION: 98 % | HEIGHT: 70 IN | WEIGHT: 200 LBS | DIASTOLIC BLOOD PRESSURE: 80 MMHG | BODY MASS INDEX: 28.63 KG/M2 | SYSTOLIC BLOOD PRESSURE: 140 MMHG

## 2019-12-18 DIAGNOSIS — I48.0 PAROXYSMAL ATRIAL FIBRILLATION (HCC): Primary | ICD-10-CM

## 2019-12-18 PROCEDURE — 93000 ELECTROCARDIOGRAM COMPLETE: CPT | Performed by: INTERNAL MEDICINE

## 2019-12-18 PROCEDURE — 99214 OFFICE O/P EST MOD 30 MIN: CPT | Performed by: INTERNAL MEDICINE

## 2019-12-25 ENCOUNTER — HOSPITAL ENCOUNTER (OUTPATIENT)
Age: 79
Setting detail: OBSERVATION
Discharge: HOME HEALTH | DRG: 149 | End: 2019-12-28
Attending: EMERGENCY MEDICINE | Admitting: INTERNAL MEDICINE
Payer: MEDICARE

## 2019-12-25 ENCOUNTER — APPOINTMENT (OUTPATIENT)
Dept: CT IMAGING | Age: 79
DRG: 149 | End: 2019-12-25
Payer: MEDICARE

## 2019-12-25 ENCOUNTER — APPOINTMENT (OUTPATIENT)
Dept: GENERAL RADIOLOGY | Age: 79
DRG: 149 | End: 2019-12-25
Payer: MEDICARE

## 2019-12-25 PROBLEM — R42 DIZZINESS: Status: ACTIVE | Noted: 2019-12-25

## 2019-12-25 LAB
A/G RATIO: 1.5 (ref 1.1–2.2)
ALBUMIN SERPL-MCNC: 4.3 G/DL (ref 3.4–5)
ALP BLD-CCNC: 52 U/L (ref 40–129)
ALT SERPL-CCNC: 20 U/L (ref 10–40)
ANION GAP SERPL CALCULATED.3IONS-SCNC: 15 MMOL/L (ref 3–16)
AST SERPL-CCNC: 22 U/L (ref 15–37)
BASOPHILS ABSOLUTE: 0 K/UL (ref 0–0.2)
BASOPHILS RELATIVE PERCENT: 0.6 %
BILIRUB SERPL-MCNC: 0.8 MG/DL (ref 0–1)
BILIRUBIN URINE: NEGATIVE
BLOOD, URINE: NEGATIVE
BUN BLDV-MCNC: 7 MG/DL (ref 7–20)
CALCIUM SERPL-MCNC: 8.9 MG/DL (ref 8.3–10.6)
CHLORIDE BLD-SCNC: 90 MMOL/L (ref 99–110)
CLARITY: ABNORMAL
CO2: 24 MMOL/L (ref 21–32)
COLOR: YELLOW
CREAT SERPL-MCNC: 0.8 MG/DL (ref 0.8–1.3)
EOSINOPHILS ABSOLUTE: 0 K/UL (ref 0–0.6)
EOSINOPHILS RELATIVE PERCENT: 0.3 %
EPITHELIAL CELLS, UA: 0 /HPF (ref 0–5)
GFR AFRICAN AMERICAN: >60
GFR NON-AFRICAN AMERICAN: >60
GLOBULIN: 2.8 G/DL
GLUCOSE BLD-MCNC: 133 MG/DL (ref 70–99)
GLUCOSE URINE: NEGATIVE MG/DL
HCT VFR BLD CALC: 35.4 % (ref 40.5–52.5)
HEMOGLOBIN: 12.2 G/DL (ref 13.5–17.5)
HYALINE CASTS: 0 /LPF (ref 0–8)
KETONES, URINE: 15 MG/DL
LEUKOCYTE ESTERASE, URINE: NEGATIVE
LYMPHOCYTES ABSOLUTE: 0.6 K/UL (ref 1–5.1)
LYMPHOCYTES RELATIVE PERCENT: 11.5 %
MCH RBC QN AUTO: 35.1 PG (ref 26–34)
MCHC RBC AUTO-ENTMCNC: 34.6 G/DL (ref 31–36)
MCV RBC AUTO: 101.5 FL (ref 80–100)
MICROSCOPIC EXAMINATION: YES
MONOCYTES ABSOLUTE: 0.3 K/UL (ref 0–1.3)
MONOCYTES RELATIVE PERCENT: 6.2 %
NEUTROPHILS ABSOLUTE: 4.6 K/UL (ref 1.7–7.7)
NEUTROPHILS RELATIVE PERCENT: 81.4 %
NITRITE, URINE: NEGATIVE
PDW BLD-RTO: 12.6 % (ref 12.4–15.4)
PH UA: 7.5 (ref 5–8)
PLATELET # BLD: 145 K/UL (ref 135–450)
PMV BLD AUTO: 7.9 FL (ref 5–10.5)
POTASSIUM SERPL-SCNC: 3.4 MMOL/L (ref 3.5–5.1)
PROTEIN UA: NEGATIVE MG/DL
RBC # BLD: 3.49 M/UL (ref 4.2–5.9)
RBC UA: 1 /HPF (ref 0–4)
SODIUM BLD-SCNC: 129 MMOL/L (ref 136–145)
SPECIFIC GRAVITY UA: 1.01 (ref 1–1.03)
TOTAL PROTEIN: 7.1 G/DL (ref 6.4–8.2)
TROPONIN: <0.01 NG/ML
URINE REFLEX TO CULTURE: ABNORMAL
URINE TYPE: ABNORMAL
UROBILINOGEN, URINE: 0.2 E.U./DL
WBC # BLD: 5.6 K/UL (ref 4–11)
WBC UA: 0 /HPF (ref 0–5)

## 2019-12-25 PROCEDURE — 71046 X-RAY EXAM CHEST 2 VIEWS: CPT

## 2019-12-25 PROCEDURE — 93005 ELECTROCARDIOGRAM TRACING: CPT | Performed by: PHYSICIAN ASSISTANT

## 2019-12-25 PROCEDURE — 80053 COMPREHEN METABOLIC PANEL: CPT

## 2019-12-25 PROCEDURE — 85025 COMPLETE CBC W/AUTO DIFF WBC: CPT

## 2019-12-25 PROCEDURE — 99283 EMERGENCY DEPT VISIT LOW MDM: CPT

## 2019-12-25 PROCEDURE — 6360000002 HC RX W HCPCS: Performed by: PHYSICIAN ASSISTANT

## 2019-12-25 PROCEDURE — 6360000004 HC RX CONTRAST MEDICATION: Performed by: PHYSICIAN ASSISTANT

## 2019-12-25 PROCEDURE — 70496 CT ANGIOGRAPHY HEAD: CPT

## 2019-12-25 PROCEDURE — 96374 THER/PROPH/DIAG INJ IV PUSH: CPT

## 2019-12-25 PROCEDURE — 2580000003 HC RX 258: Performed by: PHYSICIAN ASSISTANT

## 2019-12-25 PROCEDURE — 70450 CT HEAD/BRAIN W/O DYE: CPT

## 2019-12-25 PROCEDURE — 36415 COLL VENOUS BLD VENIPUNCTURE: CPT

## 2019-12-25 PROCEDURE — G0378 HOSPITAL OBSERVATION PER HR: HCPCS

## 2019-12-25 PROCEDURE — 6370000000 HC RX 637 (ALT 250 FOR IP): Performed by: PHYSICIAN ASSISTANT

## 2019-12-25 PROCEDURE — 2580000003 HC RX 258: Performed by: INTERNAL MEDICINE

## 2019-12-25 PROCEDURE — 84484 ASSAY OF TROPONIN QUANT: CPT

## 2019-12-25 PROCEDURE — 6370000000 HC RX 637 (ALT 250 FOR IP): Performed by: INTERNAL MEDICINE

## 2019-12-25 PROCEDURE — 99285 EMERGENCY DEPT VISIT HI MDM: CPT

## 2019-12-25 PROCEDURE — 81001 URINALYSIS AUTO W/SCOPE: CPT

## 2019-12-25 PROCEDURE — 96361 HYDRATE IV INFUSION ADD-ON: CPT

## 2019-12-25 RX ORDER — ONDANSETRON 2 MG/ML
4 INJECTION INTRAMUSCULAR; INTRAVENOUS ONCE
Status: COMPLETED | OUTPATIENT
Start: 2019-12-25 | End: 2019-12-25

## 2019-12-25 RX ORDER — ATORVASTATIN CALCIUM 10 MG/1
10 TABLET, FILM COATED ORAL DAILY
Status: DISCONTINUED | OUTPATIENT
Start: 2019-12-26 | End: 2019-12-28 | Stop reason: HOSPADM

## 2019-12-25 RX ORDER — SODIUM CHLORIDE 0.9 % (FLUSH) 0.9 %
10 SYRINGE (ML) INJECTION EVERY 12 HOURS SCHEDULED
Status: DISCONTINUED | OUTPATIENT
Start: 2019-12-25 | End: 2019-12-28 | Stop reason: HOSPADM

## 2019-12-25 RX ORDER — OXYCODONE HYDROCHLORIDE AND ACETAMINOPHEN 5; 325 MG/1; MG/1
1 TABLET ORAL EVERY 8 HOURS PRN
Status: DISCONTINUED | OUTPATIENT
Start: 2019-12-25 | End: 2019-12-28 | Stop reason: HOSPADM

## 2019-12-25 RX ORDER — ZOLPIDEM TARTRATE 5 MG/1
5 TABLET ORAL NIGHTLY PRN
Status: DISCONTINUED | OUTPATIENT
Start: 2019-12-25 | End: 2019-12-28 | Stop reason: HOSPADM

## 2019-12-25 RX ORDER — ONDANSETRON 2 MG/ML
4 INJECTION INTRAMUSCULAR; INTRAVENOUS EVERY 6 HOURS PRN
Status: DISCONTINUED | OUTPATIENT
Start: 2019-12-25 | End: 2019-12-28 | Stop reason: HOSPADM

## 2019-12-25 RX ORDER — 0.9 % SODIUM CHLORIDE 0.9 %
500 INTRAVENOUS SOLUTION INTRAVENOUS ONCE
Status: COMPLETED | OUTPATIENT
Start: 2019-12-25 | End: 2019-12-25

## 2019-12-25 RX ORDER — MECLIZINE HCL 12.5 MG/1
25 TABLET ORAL ONCE
Status: COMPLETED | OUTPATIENT
Start: 2019-12-25 | End: 2019-12-25

## 2019-12-25 RX ORDER — HYDROCHLOROTHIAZIDE 12.5 MG/1
12.5 CAPSULE, GELATIN COATED ORAL DAILY
Status: DISCONTINUED | OUTPATIENT
Start: 2019-12-26 | End: 2019-12-26

## 2019-12-25 RX ORDER — SODIUM CHLORIDE 0.9 % (FLUSH) 0.9 %
10 SYRINGE (ML) INJECTION PRN
Status: DISCONTINUED | OUTPATIENT
Start: 2019-12-25 | End: 2019-12-28 | Stop reason: HOSPADM

## 2019-12-25 RX ADMIN — IOPAMIDOL 75 ML: 755 INJECTION, SOLUTION INTRAVENOUS at 19:49

## 2019-12-25 RX ADMIN — ONDANSETRON 4 MG: 2 INJECTION INTRAMUSCULAR; INTRAVENOUS at 18:52

## 2019-12-25 RX ADMIN — SODIUM CHLORIDE 500 ML: 9 INJECTION, SOLUTION INTRAVENOUS at 18:52

## 2019-12-25 RX ADMIN — MECLIZINE 25 MG: 12.5 TABLET ORAL at 18:52

## 2019-12-25 RX ADMIN — SODIUM CHLORIDE, PRESERVATIVE FREE 10 ML: 5 INJECTION INTRAVENOUS at 23:03

## 2019-12-25 RX ADMIN — ZOLPIDEM TARTRATE 5 MG: 5 TABLET ORAL at 23:40

## 2019-12-25 ASSESSMENT — ENCOUNTER SYMPTOMS
DIARRHEA: 0
VOMITING: 0
ABDOMINAL PAIN: 0
SHORTNESS OF BREATH: 0
NAUSEA: 1
COLOR CHANGE: 0
BACK PAIN: 0

## 2019-12-25 ASSESSMENT — PAIN SCALES - GENERAL
PAINLEVEL_OUTOF10: 0
PAINLEVEL_OUTOF10: 0

## 2019-12-25 NOTE — ED NOTES
Bed: B-05  Expected date: 12/25/19  Expected time: 5:38 PM  Means of arrival:   Comments:  syncope     Jose Caputo RN  12/25/19 510

## 2019-12-25 NOTE — ED TRIAGE NOTES
Pt arrived to the ED via EMS from home. Pt c/o dizziness and nausea that has been going on on and off all day. Pt states when he opens his eyes the room is spinning. Pt states his son was on his way over and he started walking to the door. Pt states he laid himself on the floor becuase he thought he was going to fall because he was so dizzy. Pt states he feels nauseous and was dry heaving in the squad EMS gave 4mg of zofran. Pt denies LOC and hitting his head.  Pt denies pain at this time

## 2019-12-25 NOTE — ED PROVIDER NOTES
Triage Vitals [12/25/19 1756]   BP Temp Temp Source Pulse Resp SpO2 Height Weight   (!) 154/93 97.8 °F (36.6 °C) Oral 65 12 97 % 5' 9\" (1.753 m) 198 lb 3.1 oz (89.9 kg)     Physical Exam  Vitals signs and nursing note reviewed. Constitutional:       Appearance: Normal appearance. HENT:      Head: Normocephalic and atraumatic. Eyes:      Pupils: Pupils are equal, round, and reactive to light. Neck:      Musculoskeletal: Normal range of motion. Cardiovascular:      Rate and Rhythm: Normal rate. Pulses: Normal pulses. Pulmonary:      Effort: Pulmonary effort is normal. No respiratory distress. Musculoskeletal: Normal range of motion. Skin:     General: Skin is warm. Neurological:      General: No focal deficit present. Mental Status: He is alert and oriented to person, place, and time. Cranial Nerves: No cranial nerve deficit. Sensory: No sensory deficit. Motor: No weakness. Deep Tendon Reflexes: Reflexes normal.   Psychiatric:         Mood and Affect: Mood normal.       DIAGNOSTIC RESULTS     EKG: All EKG's are interpreted by KARLEY Strauss in the absence of a cardiologist.    EKG interpreted by myself - please refer to attending physician's note for complete EKG interpretation:    Rhythm: sinus rhythm   Rate: 62  No evidence of acute ischemia or injury. RADIOLOGY:   Non-plain film images such as CT, Ultrasound and MRI are read by the radiologist. Plain radiographic images are visualized and preliminarily interpreted by KARLEY Strauss with the below findings:    Reviewed radiologist dictation. Interpretation per the Radiologist below, if available at the time of this note:    CTA HEAD NECK W CONTRAST   Final Result   Addendum 1 of 1   ADDENDUM:   3D reconstructed images were performed on a separate workstation and    provided   for review. Final      CT Head WO Contrast   Final Result   No acute intracranial abnormality.       Chronic small ischemic Pulse: 65 66   Resp: 12 10   Temp: 97.8 °F (36.6 °C)    TempSrc: Oral    SpO2: 97% 99%   Weight: 198 lb 3.1 oz (89.9 kg)    Height: 5' 9\" (1.753 m)      Patient is afebrile not tachycardic not hypoxic. He is having constant dizziness is better if he keeps his eyes closed and does not move. He has nystagmus on exam.  Lab work as above with some hyponatremia. He is on blood thinners. CT scan of head without contrast and with contrast as above. Pulses intact no weakness. Attending physician evaluated the patient and advised to be admitted for MRI to rule out posterior circulation stroke. He is agreeable. CONSULTS:  IP CONSULT TO HOSPITALIST    PROCEDURES:  None    FINAL IMPRESSION      1. Dizziness    2.  Hyponatremia          DISPOSITION/PLAN   DISPOSITION Admitted 12/25/2019 08:42:49 PM      PATIENT REFERRED TO:  [address] [place] [id number]  [phone]            DISCHARGE MEDICATIONS:  New Prescriptions    No medications on file       (Please note that portions of this note were completed with a voice recognition program.  Efforts were made to edit the dictations but occasionally words are mis-transcribed.)    Rain Celis, [address], [place]  12/25/19 0899

## 2019-12-26 ENCOUNTER — APPOINTMENT (OUTPATIENT)
Dept: MRI IMAGING | Age: 79
DRG: 149 | End: 2019-12-26
Payer: MEDICARE

## 2019-12-26 PROBLEM — E87.1 HYPONATREMIA: Status: ACTIVE | Noted: 2019-12-26

## 2019-12-26 LAB
ALBUMIN SERPL-MCNC: 4.1 G/DL (ref 3.4–5)
ANION GAP SERPL CALCULATED.3IONS-SCNC: 17 MMOL/L (ref 3–16)
BUN BLDV-MCNC: 7 MG/DL (ref 7–20)
CALCIUM SERPL-MCNC: 8.9 MG/DL (ref 8.3–10.6)
CHLORIDE BLD-SCNC: 89 MMOL/L (ref 99–110)
CO2: 22 MMOL/L (ref 21–32)
CREAT SERPL-MCNC: 0.9 MG/DL (ref 0.8–1.3)
EKG ATRIAL RATE: 62 BPM
EKG DIAGNOSIS: NORMAL
EKG P AXIS: 52 DEGREES
EKG P-R INTERVAL: 188 MS
EKG Q-T INTERVAL: 456 MS
EKG QRS DURATION: 88 MS
EKG QTC CALCULATION (BAZETT): 462 MS
EKG R AXIS: -36 DEGREES
EKG T AXIS: -20 DEGREES
EKG VENTRICULAR RATE: 62 BPM
ESTIMATED AVERAGE GLUCOSE: 119.8 MG/DL
GFR AFRICAN AMERICAN: >60
GFR NON-AFRICAN AMERICAN: >60
GLUCOSE BLD-MCNC: 92 MG/DL (ref 70–99)
HBA1C MFR BLD: 5.8 %
HCT VFR BLD CALC: 34.1 % (ref 40.5–52.5)
HEMOGLOBIN: 11.6 G/DL (ref 13.5–17.5)
MCH RBC QN AUTO: 34.8 PG (ref 26–34)
MCHC RBC AUTO-ENTMCNC: 34.1 G/DL (ref 31–36)
MCV RBC AUTO: 102.3 FL (ref 80–100)
PDW BLD-RTO: 12.5 % (ref 12.4–15.4)
PHOSPHORUS: 3.4 MG/DL (ref 2.5–4.9)
PLATELET # BLD: 149 K/UL (ref 135–450)
PMV BLD AUTO: 8.3 FL (ref 5–10.5)
POTASSIUM SERPL-SCNC: 3.2 MMOL/L (ref 3.5–5.1)
RBC # BLD: 3.33 M/UL (ref 4.2–5.9)
SODIUM BLD-SCNC: 128 MMOL/L (ref 136–145)
SODIUM BLD-SCNC: 131 MMOL/L (ref 136–145)
SODIUM URINE: 61 MMOL/L
TOTAL CK: 417 U/L (ref 39–308)
TROPONIN: <0.01 NG/ML
WBC # BLD: 6.9 K/UL (ref 4–11)

## 2019-12-26 PROCEDURE — 84295 ASSAY OF SERUM SODIUM: CPT

## 2019-12-26 PROCEDURE — G0378 HOSPITAL OBSERVATION PER HR: HCPCS

## 2019-12-26 PROCEDURE — 6370000000 HC RX 637 (ALT 250 FOR IP): Performed by: NURSE PRACTITIONER

## 2019-12-26 PROCEDURE — 6370000000 HC RX 637 (ALT 250 FOR IP): Performed by: INTERNAL MEDICINE

## 2019-12-26 PROCEDURE — 83036 HEMOGLOBIN GLYCOSYLATED A1C: CPT

## 2019-12-26 PROCEDURE — 97535 SELF CARE MNGMENT TRAINING: CPT | Performed by: PHYSICIAN ASSISTANT

## 2019-12-26 PROCEDURE — 84484 ASSAY OF TROPONIN QUANT: CPT

## 2019-12-26 PROCEDURE — 82550 ASSAY OF CK (CPK): CPT

## 2019-12-26 PROCEDURE — 97530 THERAPEUTIC ACTIVITIES: CPT | Performed by: PHYSICIAN ASSISTANT

## 2019-12-26 PROCEDURE — 93010 ELECTROCARDIOGRAM REPORT: CPT | Performed by: INTERNAL MEDICINE

## 2019-12-26 PROCEDURE — 97163 PT EVAL HIGH COMPLEX 45 MIN: CPT | Performed by: PHYSICAL THERAPIST

## 2019-12-26 PROCEDURE — 97530 THERAPEUTIC ACTIVITIES: CPT | Performed by: PHYSICAL THERAPIST

## 2019-12-26 PROCEDURE — 97166 OT EVAL MOD COMPLEX 45 MIN: CPT | Performed by: PHYSICIAN ASSISTANT

## 2019-12-26 PROCEDURE — 92610 EVALUATE SWALLOWING FUNCTION: CPT

## 2019-12-26 PROCEDURE — 36415 COLL VENOUS BLD VENIPUNCTURE: CPT

## 2019-12-26 PROCEDURE — 80069 RENAL FUNCTION PANEL: CPT

## 2019-12-26 PROCEDURE — 99223 1ST HOSP IP/OBS HIGH 75: CPT | Performed by: INTERNAL MEDICINE

## 2019-12-26 PROCEDURE — 84300 ASSAY OF URINE SODIUM: CPT

## 2019-12-26 PROCEDURE — 85027 COMPLETE CBC AUTOMATED: CPT

## 2019-12-26 PROCEDURE — 83935 ASSAY OF URINE OSMOLALITY: CPT

## 2019-12-26 PROCEDURE — 1200000000 HC SEMI PRIVATE

## 2019-12-26 PROCEDURE — 2580000003 HC RX 258: Performed by: INTERNAL MEDICINE

## 2019-12-26 PROCEDURE — 6360000002 HC RX W HCPCS: Performed by: INTERNAL MEDICINE

## 2019-12-26 PROCEDURE — 96375 TX/PRO/DX INJ NEW DRUG ADDON: CPT

## 2019-12-26 RX ORDER — MECLIZINE HCL 12.5 MG/1
12.5 TABLET ORAL 3 TIMES DAILY
Status: DISCONTINUED | OUTPATIENT
Start: 2019-12-26 | End: 2019-12-27

## 2019-12-26 RX ORDER — FUROSEMIDE 10 MG/ML
40 INJECTION INTRAMUSCULAR; INTRAVENOUS ONCE
Status: COMPLETED | OUTPATIENT
Start: 2019-12-26 | End: 2019-12-26

## 2019-12-26 RX ADMIN — HYDROCHLOROTHIAZIDE 12.5 MG: 12.5 CAPSULE ORAL at 10:11

## 2019-12-26 RX ADMIN — POTASSIUM BICARBONATE 40 MEQ: 782 TABLET, EFFERVESCENT ORAL at 17:58

## 2019-12-26 RX ADMIN — MECLIZINE 12.5 MG: 12.5 TABLET ORAL at 15:55

## 2019-12-26 RX ADMIN — APIXABAN 5 MG: 5 TABLET, FILM COATED ORAL at 20:09

## 2019-12-26 RX ADMIN — SODIUM CHLORIDE, PRESERVATIVE FREE 10 ML: 5 INJECTION INTRAVENOUS at 10:12

## 2019-12-26 RX ADMIN — OXYCODONE HYDROCHLORIDE AND ACETAMINOPHEN 1 TABLET: 5; 325 TABLET ORAL at 02:13

## 2019-12-26 RX ADMIN — OXYCODONE HYDROCHLORIDE AND ACETAMINOPHEN 1 TABLET: 5; 325 TABLET ORAL at 20:09

## 2019-12-26 RX ADMIN — APIXABAN 5 MG: 5 TABLET, FILM COATED ORAL at 10:11

## 2019-12-26 RX ADMIN — POTASSIUM BICARBONATE 40 MEQ: 782 TABLET, EFFERVESCENT ORAL at 15:55

## 2019-12-26 RX ADMIN — Medication 10 ML: at 20:09

## 2019-12-26 RX ADMIN — POTASSIUM BICARBONATE 40 MEQ: 782 TABLET, EFFERVESCENT ORAL at 20:09

## 2019-12-26 RX ADMIN — FUROSEMIDE 40 MG: 10 INJECTION, SOLUTION INTRAMUSCULAR; INTRAVENOUS at 15:55

## 2019-12-26 RX ADMIN — ATORVASTATIN CALCIUM 10 MG: 10 TABLET, FILM COATED ORAL at 10:11

## 2019-12-26 RX ADMIN — SODIUM CHLORIDE, PRESERVATIVE FREE 10 ML: 5 INJECTION INTRAVENOUS at 22:34

## 2019-12-26 RX ADMIN — MECLIZINE 12.5 MG: 12.5 TABLET ORAL at 20:09

## 2019-12-26 ASSESSMENT — PAIN SCALES - GENERAL
PAINLEVEL_OUTOF10: 5
PAINLEVEL_OUTOF10: 0
PAINLEVEL_OUTOF10: 6
PAINLEVEL_OUTOF10: 0
PAINLEVEL_OUTOF10: 7
PAINLEVEL_OUTOF10: 0

## 2019-12-26 ASSESSMENT — PAIN DESCRIPTION - ORIENTATION
ORIENTATION: MID;LOWER
ORIENTATION: ANTERIOR

## 2019-12-26 ASSESSMENT — PAIN DESCRIPTION - PROGRESSION
CLINICAL_PROGRESSION: GRADUALLY WORSENING

## 2019-12-26 ASSESSMENT — PAIN - FUNCTIONAL ASSESSMENT: PAIN_FUNCTIONAL_ASSESSMENT: PREVENTS OR INTERFERES SOME ACTIVE ACTIVITIES AND ADLS

## 2019-12-26 ASSESSMENT — PAIN DESCRIPTION - ONSET: ONSET: AWAKENED FROM SLEEP

## 2019-12-26 ASSESSMENT — PAIN DESCRIPTION - DESCRIPTORS
DESCRIPTORS: PRESSURE
DESCRIPTORS: ACHING;CONSTANT

## 2019-12-26 ASSESSMENT — PAIN DESCRIPTION - FREQUENCY: FREQUENCY: CONTINUOUS

## 2019-12-26 ASSESSMENT — PAIN DESCRIPTION - PAIN TYPE: TYPE: CHRONIC PAIN

## 2019-12-26 ASSESSMENT — PAIN DESCRIPTION - LOCATION
LOCATION: HEAD
LOCATION: BACK

## 2019-12-26 NOTE — PROGRESS NOTES
NAME:  Jing Lindo  YOB: 1940  MEDICAL RECORD NUMBER:  [id number]  TODAYS DATE:  12/25/2019    Discussed personal risk factors for Stroke /TIA with patient/family, and ways to reduce the risk for a recurrent stroke. Patient's personal risk factors which were identified are:     [] Alcohol Abuse: check with your physician before any alcohol consumption. [x] Atrial fibrillation: may cause blood clots. [] Drug Abuse: Seek help, talk with your doctor  [] Clotting Disorder  [] Diabetes  [] Family history of stroke or heart disease  [x] High Blood Pressure/Hypertension: work with your physician. [x] High cholesterol: monitor cholesterol levels with your physician.   [] Overweight/Obesity: work with your physician for your ideal body weight.  [] Physical Inactivity: get regular exercise as directed by your physician. [] Personal history of previous TIA or stroke  [] Poor Diet; decrease salt (sodium) in your diet, follow diet directed by physician. [] Smoking: Cigarette/Cigar: stop smoking. Advised pt. that you can reduce your risk for stroke/TIA by modifying/controlling the risk factors that you have. Pt.advised to take the medications as prescribed, which will be detailed in the discharge instructions, and to not stop taking them without consulting their physician. In addition, pt. advised to maintain a healthy diet, exercise regularly and to not smoke. [facility]'s Stroke treatment and prevention, Managing your recovery  notebook  provided and/or reviewed  with patient/family. The notebook includes, but not limited to, sections addressing warning signs & symptoms of a stroke, which are: sudden numbness or weakness especially on one side of the body, sudden confusion, difficulty speaking or understanding, sudden changes in vision, sudden dizziness or loss of balance/ coordination, or sudden severe headache.   The need to call EMS (911) immediately if signs & symptoms occur is

## 2019-12-26 NOTE — PLAN OF CARE
Problem: Falls - Risk of:  Goal: Will remain free from falls  Description  Will remain free from falls  Outcome: Ongoing  Note:   Fall risk assessment completed . Fall precautions in place, bed alarm on, side rails 2/4 up, call light in reach, educated pt on calling for assistance when needed, room clear of clutter. Pt verbalized understanding. Goal: Absence of physical injury  Description  Absence of physical injury  Outcome: Ongoing     Problem: Safety:  Goal: Free from accidental physical injury  Description  Free from accidental physical injury  Outcome: Ongoing  Note:   Fall risk assessment completed . Fall precautions in place, bed alarm on, side rails 2/4 up, call light in reach, educated pt on calling for assistance when needed, room clear of clutter. Pt verbalized understanding. Goal: Free from intentional harm  Description  Free from intentional harm  Outcome: Ongoing     Problem: Daily Care:  Goal: Daily care needs are met  Description  Daily care needs are met  Outcome: Ongoing     Problem: Pain:  Goal: Patient's pain/discomfort is manageable  Description  Patient's pain/discomfort is manageable  Outcome: Ongoing  Note:   Pain /discomfort being managed with PRN analgesics per MD orders and patient able to express presence and absence of pain and rate pain appropriately using numerical scale.      Goal: Pain level will decrease  Description  Pain level will decrease  Outcome: Ongoing  Goal: Control of acute pain  Description  Control of acute pain  Outcome: Ongoing  Goal: Control of chronic pain  Description  Control of chronic pain  Outcome: Ongoing     Problem: Skin Integrity:  Goal: Skin integrity will stabilize  Description  Skin integrity will stabilize  Outcome: Ongoing     Problem: Discharge Planning:  Goal: Patients continuum of care needs are met  Description  Patients continuum of care needs are met  Outcome: Ongoing     Problem: ACTIVITY INTOLERANCE/IMPAIRED MOBILITY  Goal: Mobility/activity is maintained at optimum level for patient  Outcome: Ongoing

## 2019-12-26 NOTE — PROGRESS NOTES
Checking on patient Q2H for nutrition needs, hygiene needs, comfort measures, mobility, fall risk interventions, and safe environment. All precautions and interventions in place. Educated patient on use of call light and telephone. Patient verbalizes understanding. Call light/telephone in reach.   Electronically signed by Sánchez Balbuena RN on 12/26/2019 at 10:31 AM

## 2019-12-26 NOTE — PROGRESS NOTES
Called patient's physician, Dr. Salvatore Urias ([phone]), and spoke with his office regarding implanted pain pump. Per MRI, pain pump that was switched out in 2014 was not a medtronic device. Per Dr. Roya Landrum office, it is a medtronic device. Physician office to fax over the specifics of implanted device for clairification.   Awaiting fax

## 2019-12-26 NOTE — PROGRESS NOTES
Occupational Therapy   Occupational Therapy Initial Assessment  Date: 2019   Patient Name: Piedad Bah  MRN: [id number]     : 1940    Date of Service: 2019    Discharge Recommendations:  Patient would benefit from continued therapy after discharge, 3-5 sessions per week  OT Equipment Recommendations  Other: To be determined    Assessment   Performance deficits / Impairments: Decreased functional mobility ; Decreased high-level IADLs;Decreased ADL status; Decreased endurance;Decreased balance;Decreased posture  Assessment: Pt is admitted from home alone where he is fully independent and driving. Pt is presently very limited by dizziness and is unable to complete his ADLS and mobility safely. Recommend continued OT 3-5 xs per week at [facility]. Treatment Diagnosis: Impaired ADLs  Prognosis: Fair;Good  Decision Making: Medium Complexity  OT Education: OT Role;Plan of Care;Family Education  Patient Education: Safety   Barriers to Learning: Dizziness  REQUIRES OT FOLLOW UP: Yes  Activity Tolerance  Activity Tolerance: Treatment limited secondary to medical complications (free text)  Activity Tolerance: Limited with dizziness. Safety Devices  Safety Devices in place: Yes  Type of devices: Patient at risk for falls;Call light within reach; Bed alarm in place; Left in bed  Restraints  Initially in place: No           Patient Diagnosis(es): The primary encounter diagnosis was Dizziness. A diagnosis of Hyponatremia was also pertinent to this visit. has a past medical history of Atrial fibrillation ([address] Back pain, Cancer ([address] Hyperlipidemia, and Hypertension. has a past surgical history that includes Prostate surgery; Colonoscopy (2016); back surgery; Prostatectomy; and Tonsillectomy.     Treatment Diagnosis: Impaired ADLs      Restrictions  Restrictions/Precautions  Restrictions/Precautions: Fall Risk  Position Activity Restriction  Other position/activity restrictions: Dizziness    Subjective General  Chart Reviewed: Yes  Patient assessed for rehabilitation services?: Yes  Additional Pertinent Hx: Pt  has a past medical history of Atrial fibrillation ([facility] Back pain, Cancer ([facility] [address] Hyperlipidemia, and Hypertension. Pt  has a past surgical history that includes Prostate surgery; Colonoscopy (12/30/2016); back surgery; Prostatectomy; and Tonsillectomy. Family / Caregiver Present: No  Referring Practitioner: Yolanda  Diagnosis: Dizziness. Pt admited on 12-25 via ED with nausea and vomiting. General Comment  Comments: Pt c/o chronic R hip and back pain 3/10. Social/Functional History  Social/Functional History  Lives With: Alone  Type of Home: ([place])  Home Layout: One level  Home Access: Stairs to enter with rails  Entrance Stairs - Number of Steps: 3 hailey, then 18 inside steps  Bathroom Shower/Tub: Tub/Shower unit  Bathroom Toilet: Standard  Bathroom Accessibility: Accessible  ADL Assistance: Independent  Homemaking Assistance: Independent  Ambulation Assistance: Independent  Transfer Assistance: Independent  Active : Yes  Occupation: Retired  Type of occupation: Worked for [employer], then [address]: Goes to [employer] in the am. Reading. Additional Comments: pt reports having a supportive family       Objective   Vision: Impaired(Pt has to have large print for reading, and he did not comment on having glasses.  )  Hearing: Within functional limits    Orientation  Overall Orientation Status: Within Functional Limits     Balance  Sitting Balance: Contact guard assistance  Standing Balance: Minimal assistance  Functional Mobility  Functional - Mobility Device: No device  Assist Level: Minimal assistance  Functional Mobility Comments: Few steps to HOB. ADL  Feeding: Minimal assistance  Grooming: Minimal assistance  UE Bathing: Minimal assistance  LE Bathing: Minimal assistance; Moderate assistance  UE Dressing: Minimal assistance  LE Dressing: Minimal assistance; Moderate

## 2019-12-26 NOTE — PROGRESS NOTES
trials. SLP will followup for MRI results and possible f/u for diet safety as well as need for SLP evaluation, pending results. Dysphagia Outcome Severity Scale: Level 6: Within functional limits/Modified independence     Treatment Plan  Requires SLP Intervention: Yes  Duration/Frequency of Treatment: 1-3x for diet safety; monitor for need for SLP eval pending MRI results  D/C Recommendations: To be determined     Recommended Diet and Intervention  Diet Solids Recommendation: Regular  Liquid Consistency Recommendation: Thin  Recommended Form of Meds: Whole with water  Recommendations: (dysphagia f/u and SLP eval, pending MRI results (pt denies any changes in speech/language))  Therapeutic Interventions: Diet tolerance monitoring;Patient/Family education    Compensatory Swallowing Strategies  Compensatory Swallowing Strategies: Remain upright for 30-45 minutes after meals;Upright as possible for all oral intake    Treatment/Goals  Short-term Goals  Timeframe for Short-term Goals: 1-3x  Dysphagia Goals: The patient will tolerate recommended diet without observed clinical signs of aspiration; The patient will recall and perform compensatory strategies, with no cues. General  Chart Reviewed: Yes  Subjective  Subjective: Cooperative and pleasant for eval; easily awakens. Denies any changes with speech, word finding, and cognition  Behavior/Cognition: Alert; Cooperative;Pleasant mood  Respiratory Status: Room air  Communication Observation: Functional  Follows Directions: Complex  Dentition: Adequate  Patient Positioning: Upright in bed  Baseline Vocal Quality: Normal  Volitional Cough: Strong  Consistencies Administered: Reg solid; Dysphagia Pureed (Dysphagia I); Nectar - cup; Thin - straw     Vision/Hearing  Vision  Vision: Impaired(Pt has to have large print for reading, and he did not comment on having glasses.  )  Hearing  Hearing: Within functional limits    Oral Motor Deficits  Oral/Motor  Oral Motor:  Within

## 2019-12-26 NOTE — PROGRESS NOTES
4 Eyes Skin Assessment     The patient is being assess for  Admission    I agree that 2 RN's have performed a thorough Head to Toe Skin Assessment on the patient. ALL assessment sites listed below have been assessed. Areas assessed by both nurses:   [x]   Head, Face, and Ears   [x]   Shoulders, Back, and Chest  [x]   Arms, Elbows, and Hands   [x]   Coccyx, Sacrum, and IschIum  [x]   Legs, Feet, and Heels        Does the Patient have Skin Breakdown?   No         Earl Prevention initiated:  NA   Wound Care Orders initiated:  NA      [facility] nurse consulted for Pressure Injury (Stage 3,4, Unstageable, DTI, NWPT, and Complex wounds), New and Established Ostomies:  NA      Nurse 1 eSignature: Electronically signed by Danie Stern RN on 12/26/19 at 12:00 AM    **SHARE this note so that the co-signing nurse is able to place an eSignature**    Nurse 2 eSignature: Electronically signed by Luis Craven RN on 12/26/19 at 3:37 AM

## 2019-12-26 NOTE — H&P
Hospital Medicine History & Physical      PCP: Tyra Stewart    Date of Admission: 12/25/2019    Date of Service: Pt seen/examined on 12/25/19 and  Placed in Observation. Chief Complaint:  Dizziness       History Of Present Illness: The patient is a 78 y.o. male who presents to [facility] with acute onset and progressive course of dizziness. Started 2 days ago worse with head movement  no relieving factors dizziness associated with room spinning feeling and nausea and difficult ambulation no fever chills cp sob cough or other neurological problems     Past Medical History:        Diagnosis Date    Atrial fibrillation (HCC)     Back pain     Cancer ([facility] [address]     prostate    Hyperlipidemia     Hypertension        Past Surgical History:        Procedure Laterality Date    BACK SURGERY      COLONOSCOPY  12/30/2016    Dr Josue Frances         Medications Prior to Admission:    Prior to Admission medications    Medication Sig Start Date End Date Taking? Authorizing Provider   hydrochlorothiazide (MICROZIDE) 12.5 MG capsule TAKE ONE CAPSULE BY MOUTH DAILY 10/29/19   April AI Sewell - MALA   oxyCODONE-acetaminophen (PERCOCET) 5-325 MG per tablet Take 1 tablet by mouth every 8 hours as needed for Pain. Historical Provider, MD   ELIQUZELDA 5 MG TABS tablet TAKE ONE TABLET BY MOUTH TWICE A DAY 8/14/19   Zane Martin MD   temazepam (RESTORIL) 30 MG capsule Take 30 mg by mouth nightly. . 2/4/19   Historical Provider, MD   PARoxetine (PAXIL) 20 MG tablet Take 20 mg by mouth daily 2/27/19 9/28/19  Historical Provider, MD   atorvastatin (LIPITOR) 10 MG tablet Take 1 tablet by mouth daily 1/4/17   Tono Tejada MD   vitamin D (ERGOCALCIFEROL) 47769 UNITS CAPS capsule Take 50,000 Units by mouth once a week    Historical easily obliterated with pressure      CXR:  I have reviewed the CXR   EKG:  I have reviewed the EKG     CBC   Recent Labs     12/25/19 1823   WBC 5.6   HGB 12.2*   HCT 35.4*         RENAL  Recent Labs     12/25/19 1823   *   K 3.4*   CL 90*   CO2 24   BUN 7   CREATININE 0.8     LFT'S  Recent Labs     12/25/19 1823   AST 22   ALT 20   BILITOT 0.8   ALKPHOS 52     COAG  No results for input(s): INR in the last 72 hours. CARDIAC ENZYMES  Recent Labs     12/25/19 1823   TROPONINI <0.01       U/A:    Lab Results   Component Value Date    COLORU YELLOW 12/25/2019    WBCUA 0 12/25/2019    RBCUA 1 12/25/2019    CLARITYU CLOUDY 12/25/2019    SPECGRAV 1.011 12/25/2019    LEUKOCYTESUR Negative 12/25/2019    BLOODU Negative 12/25/2019    GLUCOSEU Negative 12/25/2019       ABG  No results found for: BDN3SGE, BEART, D2FEVWXO, PHART, THGBART, YHY3OPV, PO2ART, LCN6RVE        Active Hospital Problems    Diagnosis Date Noted    Dizziness [R42] 12/25/2019         PHYSICIANS CERTIFICATION:    I certify that Simran Hurst is expected to be hospitalized for less than 2 midnights based on the following assessment and plan:      ASSESSMENT/PLAN:    afib on ac  htn  hld    Tele  Serial neuro check  Mri  Stroke secondary prevention  Resume home meds      DVT Prophylaxis: eliquis  Diet: Diet NPO Effective Now  Code Status: Full Code      Dispo - obs. Iggy Cast MD    Thank you Nga Rodriguez for the opportunity to be involved in this patient's care. If you have any questions or concerns please feel free to contact me at [phone].

## 2019-12-26 NOTE — ED NOTES
ED SBAR report provider to [facility]. Patient to be transported to Room 5130 via stretcher by transport tech. Patient transported with bedside cardiac monitor and with IV medications infusing. IV site clean, dry, and intact. MEWS score and pain assessed and documented. Updated patient and family on plan of care.      Anya Valenzuela RN  12/25/19 5778

## 2019-12-26 NOTE — CONSULTS
· Respiratory: negative for - cough, sputum  · Cardiovascular: Reviewed in HPI  · Gastrointestinal: negative for - abdominal pain, diarrhea, N/V  · Hematology: negative for - bleeding, blood clots, bruising or jaundice  · Genito-Urinary:  negative for - Dysuria or incontinence  · Musculoskeletal: negative for - Joint swelling, muscle pain  · Neurological: negative for - confusion, dizziness, headaches   · Psychiatric: No anxiety, no depression. · Dermatological: negative for - rash    Physical Examination:  Vitals:    19 1310   BP: 113/68   Pulse: 63   Resp: 12   Temp: 97.8 °F (36.6 °C)   SpO2: 98%        Intake/Output Summary (Last 24 hours) at 2019 1419  Last data filed at 2019 1212  Gross per 24 hour   Intake --   Output 475 ml   Net -475 ml     In: -   Out: 475    Wt Readings from Last 3 Encounters:   19 189 lb 13.1 oz (86.1 kg)   19 200 lb (90.7 kg)   10/08/19 188 lb (85.3 kg)     Temp  Av °F (36.7 °C)  Min: 97.8 °F (36.6 °C)  Max: 98.6 °F (37 °C)  Pulse  Av.5  Min: 63  Max: 72  BP  Min: 108/64  Max: 165/88  SpO2  Av.3 %  Min: 94 %  Max: 100 %    · Telemetry: Sinus rhythm with v-rate 70's bpm  · Constitutional: Alert. Oriented to person, place, and time. No distress. · Head: Normocephalic and atraumatic. · Mouth/Throat: Lips appear moist. Oropharynx is clear and moist.  · Eyes: Conjunctivae normal. EOM are normal.   · Neck: Neck supple. No lymphadenopathy. No rigidity. 12cm JVD present. · Cardiovascular: Normal rate, regular rhythm. Normal S1&S2. Carotid pulse 2+ bilaterally. · Pulmonary/Chest: Bilateral respiratory sounds present. No respiratory accessory muscle use. No wheezes, No rhonchi. No rales. · Abdominal: Soft. Normal bowel sounds present. No distension, No tenderness. No splenomegaly. No hernia. · Musculoskeletal: No tenderness. No BLE edema    · Lymphadenopathy: Has no cervical adenopathy. · Neurological: Alert and oriented.  Cranial nerve 12/25/2019         Recommendation(s):    New-onset progressive dizziness  -unclear etiology  -patient has been compliant with medications, including [address]. -patient has had adequate appetite and has been maintaining adequate hydration  -preliminary report from the event monitoring reveals no atrial nor ventricular bradydysrhythmia nor tachydysrhythmia, nor pauses, nor asystole.  -would recommend continuing the event monitor  -no further cardiac testing at this time  -the patient should follow-up with me as outpatient to follow-up after wearing the event monitor. CHF, diastolic  -the patient does not adhere to a low-salt diet, frequently eating canned vegetables  -will diurese with Lasix 40mg IV x 1 to achieve euvolemia    Will sign off. Thank you for allowing me to participate in the care of Brisa Verde . If you have any questions/comments, please do not hesitate to contact us.     Dinorah Salas MD, MS, [facility], [facility]  Cardiac Electrophysiology  [address]  [address] [facility], [facility]  [place] [address]  [phone]

## 2019-12-26 NOTE — PROGRESS NOTES
MRI called, can't do the MRI right now without the cards for the neurostimulator and the pain pump. Patient thinks son has cards in wallet.   Will await arrival of the son

## 2019-12-26 NOTE — PROGRESS NOTES
Colonoscopy (12/30/2016); back surgery; Prostatectomy; and Tonsillectomy. Restrictions  Restrictions/Precautions  Restrictions/Precautions: Fall Risk  Position Activity Restriction  Other position/activity restrictions: Dizziness  Vision/Hearing        Subjective  General  Chart Reviewed: Yes  Patient assessed for rehabilitation services?: Yes  Additional Pertinent Hx: Per ER note:  I have personally performed a face to face diagnostic evaluation on this patient. I have reviewed the mid-levels findings and agree. History and Exam by me shows 43-year-old male with history of atrial fibrillation on Eliquis for anticoagulation, presenting due to acute onset of persistent vertigo, associated with nausea and vomiting, exacerbated by head movements. Has persistent vertigo and nystagmus even at rest, with eyes closed, with no head movements. No other focal neurologic deficits. Awake and alert, appropriately interactive. 5-5 strength in all extremities. No pronator drift. Light touch sensation diffusely intact. No extinction.   Response To Previous Treatment: Not applicable  Family / Caregiver Present: No  Follows Commands: Within Functional Limits  Subjective  Subjective: pt very pleasant; kept his eyes closed frequently during session, able to open on command but then kept closing them again; reports chronic discomfort in his neck and back  Pain Screening  Patient Currently in Pain: Yes          Orientation  Orientation  Overall Orientation Status: Within Functional Limits  Social/Functional History  Social/Functional History  Lives With: Alone  Type of Home: (Condo)  Home Layout: One level  Home Access: Stairs to enter with rails  Entrance Stairs - Number of Steps: 3 hailey, then 18 inside steps  Bathroom Shower/Tub: Tub/Shower unit  Bathroom Toilet: Standard  Bathroom Accessibility: Accessible  ADL Assistance: Independent  Homemaking Assistance: Independent  Ambulation Assistance: Independent  Transfer Assistance: Independent  Active : Yes  Occupation: Retired  Type of occupation: Worked for [employer], then [id number]: Goes to [employer] in the am. Reading.   Additional Comments: pt reports having a supportive family  Cognition        Objective          AROM RLE (degrees)  RLE AROM: WFL  AROM LLE (degrees)  LLE AROM : WFL  Strength RLE  Strength RLE: WFL  Strength LLE  Strength LLE: WFL     Sensation  Overall Sensation Status: WFL  Bed mobility  Supine to Sit: Stand by assistance(using rails to push up from)  Sit to Supine: Stand by assistance  Transfers  Sit to Stand: Contact guard assistance  Stand to sit: Contact guard assistance  Ambulation  Ambulation?: Yes  Ambulation 1  Surface: level tile  Device: Hand-Held Assist  Assistance: Minimal assistance  Quality of Gait: unsteady and shaky  Distance: 2-3 side steps at EOB     Balance  Comments: SBA for static sitting balance; CGA for static standing balance        Plan   Plan  Times per week: 3-5  Current Treatment Recommendations: Functional Mobility Training, Balance Training  Safety Devices  Type of devices: Call light within reach, Bed alarm in place, Left in bed, Nurse notified      AM-PAC Score  AM-PAC Inpatient Mobility Raw Score : 17 (12/26/19 0833)  AM-PAC Inpatient T-Scale Score : 42.13 (12/26/19 6520)  Mobility Inpatient CMS 0-100% Score: 50.57 (12/26/19 8542)  Mobility Inpatient CMS G-Code Modifier : CK (12/26/19 5264)          Goals  Short term goals  Time Frame for Short term goals: by discharge  Short term goal 1: bed mob MI  Short term goal 2: transfers MI  Short term goal 3: amb 100' without AD MI  Patient Goals   Patient goals : pt wants to be able to move without feeling dizzy       Therapy Time   Individual Concurrent Group Co-treatment   Time In 0754         Time Out 7630         Minutes 39              If patient is discharged prior to the next physical therapy visit;  Please see last written PT note for discharge status.     Ольга Alarcon, PT, [id number]   ОЛЬГА ALARCON, PT

## 2019-12-27 ENCOUNTER — APPOINTMENT (OUTPATIENT)
Dept: MRI IMAGING | Age: 79
DRG: 149 | End: 2019-12-27
Payer: MEDICARE

## 2019-12-27 ENCOUNTER — APPOINTMENT (OUTPATIENT)
Dept: GENERAL RADIOLOGY | Age: 79
DRG: 149 | End: 2019-12-27
Payer: MEDICARE

## 2019-12-27 LAB
ALBUMIN SERPL-MCNC: 4.3 G/DL (ref 3.4–5)
ANION GAP SERPL CALCULATED.3IONS-SCNC: 17 MMOL/L (ref 3–16)
BUN BLDV-MCNC: 12 MG/DL (ref 7–20)
CALCIUM SERPL-MCNC: 9.4 MG/DL (ref 8.3–10.6)
CHLORIDE BLD-SCNC: 89 MMOL/L (ref 99–110)
CO2: 27 MMOL/L (ref 21–32)
CORTISOL TOTAL: 6.3 UG/DL
CREAT SERPL-MCNC: 1.1 MG/DL (ref 0.8–1.3)
GFR AFRICAN AMERICAN: >60
GFR NON-AFRICAN AMERICAN: >60
GLUCOSE BLD-MCNC: 96 MG/DL (ref 70–99)
OSMOLALITY URINE: 230 MOSM/KG (ref 390–1070)
PHOSPHORUS: 2.7 MG/DL (ref 2.5–4.9)
POTASSIUM SERPL-SCNC: 4 MMOL/L (ref 3.5–5.1)
SODIUM BLD-SCNC: 133 MMOL/L (ref 136–145)
TSH REFLEX FT4: 3.5 UIU/ML (ref 0.27–4.2)

## 2019-12-27 PROCEDURE — 94760 N-INVAS EAR/PLS OXIMETRY 1: CPT

## 2019-12-27 PROCEDURE — 1200000000 HC SEMI PRIVATE

## 2019-12-27 PROCEDURE — 97116 GAIT TRAINING THERAPY: CPT | Performed by: PHYSICAL THERAPIST

## 2019-12-27 PROCEDURE — 2580000003 HC RX 258: Performed by: INTERNAL MEDICINE

## 2019-12-27 PROCEDURE — 6370000000 HC RX 637 (ALT 250 FOR IP): Performed by: NURSE PRACTITIONER

## 2019-12-27 PROCEDURE — 82533 TOTAL CORTISOL: CPT

## 2019-12-27 PROCEDURE — 36415 COLL VENOUS BLD VENIPUNCTURE: CPT

## 2019-12-27 PROCEDURE — 97530 THERAPEUTIC ACTIVITIES: CPT

## 2019-12-27 PROCEDURE — 6370000000 HC RX 637 (ALT 250 FOR IP): Performed by: INTERNAL MEDICINE

## 2019-12-27 PROCEDURE — 80069 RENAL FUNCTION PANEL: CPT

## 2019-12-27 PROCEDURE — 84443 ASSAY THYROID STIM HORMONE: CPT

## 2019-12-27 PROCEDURE — 97530 THERAPEUTIC ACTIVITIES: CPT | Performed by: PHYSICAL THERAPIST

## 2019-12-27 PROCEDURE — 74018 RADEX ABDOMEN 1 VIEW: CPT

## 2019-12-27 PROCEDURE — 70551 MRI BRAIN STEM W/O DYE: CPT

## 2019-12-27 PROCEDURE — G0378 HOSPITAL OBSERVATION PER HR: HCPCS

## 2019-12-27 PROCEDURE — 92526 ORAL FUNCTION THERAPY: CPT

## 2019-12-27 RX ORDER — COSYNTROPIN 0.25 MG/ML
250 INJECTION, POWDER, FOR SOLUTION INTRAMUSCULAR; INTRAVENOUS ONCE
Status: COMPLETED | OUTPATIENT
Start: 2019-12-28 | End: 2019-12-28

## 2019-12-27 RX ORDER — LORAZEPAM 0.5 MG/1
0.5 TABLET ORAL NIGHTLY PRN
Status: DISCONTINUED | OUTPATIENT
Start: 2019-12-27 | End: 2019-12-28 | Stop reason: HOSPADM

## 2019-12-27 RX ORDER — MECLIZINE HCL 12.5 MG/1
25 TABLET ORAL 3 TIMES DAILY
Status: DISCONTINUED | OUTPATIENT
Start: 2019-12-27 | End: 2019-12-28 | Stop reason: HOSPADM

## 2019-12-27 RX ADMIN — SODIUM CHLORIDE, PRESERVATIVE FREE 10 ML: 5 INJECTION INTRAVENOUS at 09:25

## 2019-12-27 RX ADMIN — SODIUM CHLORIDE, PRESERVATIVE FREE 10 ML: 5 INJECTION INTRAVENOUS at 20:34

## 2019-12-27 RX ADMIN — ATORVASTATIN CALCIUM 10 MG: 10 TABLET, FILM COATED ORAL at 09:25

## 2019-12-27 RX ADMIN — APIXABAN 5 MG: 5 TABLET, FILM COATED ORAL at 20:33

## 2019-12-27 RX ADMIN — OXYCODONE HYDROCHLORIDE AND ACETAMINOPHEN 1 TABLET: 5; 325 TABLET ORAL at 20:33

## 2019-12-27 RX ADMIN — LORAZEPAM 0.5 MG: 0.5 TABLET ORAL at 23:23

## 2019-12-27 RX ADMIN — APIXABAN 5 MG: 5 TABLET, FILM COATED ORAL at 09:25

## 2019-12-27 RX ADMIN — MECLIZINE 12.5 MG: 12.5 TABLET ORAL at 09:25

## 2019-12-27 RX ADMIN — MECLIZINE 25 MG: 12.5 TABLET ORAL at 20:33

## 2019-12-27 RX ADMIN — MECLIZINE 25 MG: 12.5 TABLET ORAL at 14:21

## 2019-12-27 ASSESSMENT — PAIN DESCRIPTION - PAIN TYPE: TYPE: CHRONIC PAIN

## 2019-12-27 ASSESSMENT — PAIN DESCRIPTION - ONSET: ONSET: GRADUAL

## 2019-12-27 ASSESSMENT — PAIN DESCRIPTION - LOCATION: LOCATION: HEAD

## 2019-12-27 ASSESSMENT — PAIN DESCRIPTION - PROGRESSION: CLINICAL_PROGRESSION: GRADUALLY WORSENING

## 2019-12-27 ASSESSMENT — PAIN - FUNCTIONAL ASSESSMENT: PAIN_FUNCTIONAL_ASSESSMENT: PREVENTS OR INTERFERES SOME ACTIVE ACTIVITIES AND ADLS

## 2019-12-27 ASSESSMENT — PAIN SCALES - GENERAL
PAINLEVEL_OUTOF10: 0
PAINLEVEL_OUTOF10: 6
PAINLEVEL_OUTOF10: 1

## 2019-12-27 ASSESSMENT — PAIN DESCRIPTION - DESCRIPTORS: DESCRIPTORS: PRESSURE

## 2019-12-27 ASSESSMENT — PAIN DESCRIPTION - FREQUENCY: FREQUENCY: INTERMITTENT

## 2019-12-27 ASSESSMENT — PAIN DESCRIPTION - ORIENTATION: ORIENTATION: ANTERIOR

## 2019-12-27 NOTE — PROGRESS NOTES
Physical Therapy  Facility/Department: [facility]  Daily Treatment Note  NAME: Piedad Bah  : 1940  MRN: [id number]    Date of Service: 2019    Discharge Recommendations:  Patient would benefit from continued therapy after discharge, 5-7 sessions per week   PT Equipment Recommendations  Equipment Needed: No  Other: will continue to assess  Piedad Bah scored a 14/24 on the AM-PAC short mobility form. Current research shows that an AM-PAC score of 17 or less is typically not associated with a discharge to the patient's home setting. Based on the patients AM-PAC score and their current functional mobility deficits, it is recommended that the patient have 5-7 sessions per week of Physical Therapy at d/c to increase the patients independence. Assessment   Body structures, Functions, Activity limitations: Decreased functional mobility ; Decreased balance  Assessment: pt is a 77 yo male who was adm to [facility] with dizziness and nausea/vomiting; pt currently denies any dizziness but significant unsteadiness with any functional tasks (pt denies dizziness, denies room spinning, denies lightheadedness); pt has decreased insight into his deficits and safety issues reporting he wants to go home now despite needing assist of 2 for walking safely; pt at baseline lives alone and Ind without an AD; feel he is a high fall risk and will need continued therapy at discharge; would benefit from therapy 5-7 x/wk  Prognosis: Fair  Decision Making: High Complexity  PT Education: General Safety  Barriers to Learning: decreased insight into deficits  REQUIRES PT FOLLOW UP: Yes  Activity Tolerance  Activity Tolerance: Patient Tolerated treatment well  Activity Tolerance: limited by decreased insight into deficits and safety     Patient Diagnosis(es): The primary encounter diagnosis was Dizziness. A diagnosis of Hyponatremia was also pertinent to this visit.      has a past medical history of Atrial fibrillation ([facility] Back pain, Cancer ([facility] Hyperlipidemia, and Hypertension. has a past surgical history that includes Prostate surgery; Colonoscopy (12/30/2016); back surgery; Prostatectomy; and Tonsillectomy. Restrictions  Restrictions/Precautions  Restrictions/Precautions: Fall Risk  Position Activity Restriction  Other position/activity restrictions: pt denied any dizziness and reported no nausea however very unsteady with standing activities  Subjective   General  Chart Reviewed: Yes  Additional Pertinent Hx: Per ER note:  I have personally performed a face to face diagnostic evaluation on this patient. I have reviewed the mid-levels findings and agree. History and Exam by me shows 40-year-old male with history of atrial fibrillation on Eliquis for anticoagulation, presenting due to acute onset of persistent vertigo, associated with nausea and vomiting, exacerbated by head movements. Has persistent vertigo and nystagmus even at rest, with eyes closed, with no head movements. No other focal neurologic deficits. Awake and alert, appropriately interactive. 5-5 strength in all extremities. No pronator drift. Light touch sensation diffusely intact. No extinction. MRI not able to be done due to pain pump  Response To Previous Treatment: Patient with no complaints from previous session. Family / Caregiver Present: No  Subjective  Subjective: pt has decreased insight into safety issues and his deficits; pt asking when he could go home after demonstrating safety issues with walking; states \"I have to get home today\"          Orientation  Orientation  Overall Orientation Status: Within Functional Limits  Cognition   Cognition  Overall Cognitive Status: Exceptions  Arousal/Alertness: Appropriate responses to stimuli  Following Commands:  Follows all commands without difficulty  Attention Span: Appears intact  Memory: Appears intact  Safety Judgement: Decreased awareness of need for safety;Decreased dizzy    Plan    Plan  Times per week: 3-5  Current Treatment Recommendations: Functional Mobility Training, Balance Training  Safety Devices  Type of devices: Call light within reach, Chair alarm in place, Left in chair, Nurse notified     Therapy Time   Individual Concurrent Group Co-treatment   Time In 9080 [id number]         Time Out 0935         Minutes 46              If patient is discharged prior to the next physical therapy visit;  Please see last written PT note for discharge status.     Ольга Alarcon, PT, 5854   ОЛЬГА ALARCON, PT

## 2019-12-27 NOTE — PLAN OF CARE
Problem: Falls - Risk of:  Goal: Will remain free from falls  Description  Will remain free from falls  12/27/2019 1031 by Mane Villa RN  Outcome: Ongoing  Note:   Bed alarm kept on. Call light in reach. Side rails up x2. Pt reminded to use call light for assistance getting out of bed. Hourly rounding done to anticipate pt needs. Problem: Safety:  Goal: Free from accidental physical injury  Description  Free from accidental physical injury  12/27/2019 1031 by Mane Villa RN  Outcome: Ongoing  Note:   Patient assessed for fall risk; fall precautions initiated. Patient and family instructed about safety devices. Environment kept free of clutter and adequate lighting provided. Bed locked and in lowest position. Call light within reach. Will continue to monitor. Problem: Daily Care:  Goal: Daily care needs are met  Description  Daily care needs are met  12/27/2019 1031 by Mane Villa RN  Outcome: Ongoing  Note:   Patient's ability assessed to perform self care and independent activity encouraged according to that ability. Assisted with ADL's as needed. Risk for skin breakdown assessed. Potential discharge needs assessed. Patient and family included in daily care decisions. Problem: Pain:  Goal: Pain level will decrease  Description  Pain level will decrease  12/27/2019 1032 by Mane Villa RN  Outcome: Ongoing  Note:   Pain/discomfort being managed with PRN analgesics per MD orders. Pt able to express presence and absence of pain and rate pain appropriately using numerical scale.       Problem: ACTIVITY INTOLERANCE/IMPAIRED MOBILITY  Goal: Mobility/activity is maintained at optimum level for patient  12/27/2019 1032 by Mane Villa RN  Outcome: Ongoing

## 2019-12-27 NOTE — PLAN OF CARE
Discharge Planning:  Goal: Patients continuum of care needs are met  Description  Patients continuum of care needs are met  Outcome: Ongoing     Problem: ACTIVITY INTOLERANCE/IMPAIRED MOBILITY  Goal: Mobility/activity is maintained at optimum level for patient  Outcome: Ongoing

## 2019-12-27 NOTE — PROGRESS NOTES
Ambulating(RW)  Equipment Used: Bed;Other(bed to chair)  Level of Asssistance: 2 Person assistance; Moderate assistance;Minimal assistance  Bed mobility  Supine to Sit: Stand by assistance  Sit to Supine: Unable to assess(up in chair after session)  Scooting: Stand by assistance  Transfers  Sit to stand: Contact guard assistance  Stand to sit: Contact guard assistance  Transfer Comments: to/from RW; no reports of dizziness with transition                       Cognition  Overall Cognitive Status: Exceptions  Arousal/Alertness: Appropriate responses to stimuli  Following Commands: Follows all commands without difficulty  Attention Span: Appears intact  Memory: Appears intact  Safety Judgement: Decreased awareness of need for safety;Decreased awareness of need for assistance  Insights: Decreased awareness of deficits              Plan   Plan  Times per week: 3-5 xs  Times per day: Daily  Plan weeks: 1-2 weeks  Current Treatment Recommendations: Strengthening, Safety Education & Training, Balance Training, Patient/Caregiver Education & Training, Self-Care / ADL, Functional Mobility Training, Neuromuscular Re-education, Equipment Evaluation, Education, & procurement, Home Management Training, Endurance Training    AM-PAC Score        AM-[facility] Inpatient Daily Activity Raw Score: 19 (12/27/19 0922)  AM-PAC Inpatient ADL T-Scale Score : 40.22 (12/27/19 0922)  ADL Inpatient CMS 0-100% Score: 42.8 (12/27/19 0922)  ADL Inpatient CMS G-Code Modifier : CK (12/27/19 0922)    Goals  Short term goals  Time Frame for Short term goals: 1-2 weeks; ongoing 12/27  Short term goal 1: Mod I bathing  Short term goal 2: I dressing  Short term goal 3: I toileting  Short term goal 4: I functional transfers  Short term goal 5: I grooming at sink  Short term goal 6: I light home tasks.   Patient Goals   Patient goals : Pt wants to get rid of the dizziness         Therapy Time   Individual Concurrent Group Co-treatment   Time In 0844         Time

## 2019-12-27 NOTE — PROGRESS NOTES
changes have been noted. Scheduled and prn Medications:    Scheduled Meds:   meclizine  25 mg Oral TID    [START ON 12/28/2019] cosyntropin  250 mcg Intravenous Once    atorvastatin  10 mg Oral Daily    apixaban  5 mg Oral BID    sodium chloride flush  10 mL Intravenous 2 times per day     Continuous Infusions:  PRN Meds:. LORazepam, oxyCODONE-acetaminophen, sodium chloride flush, magnesium hydroxide, ondansetron, zolpidem    PHYSICAL EXAM:  /78   Pulse 81   Temp 98.2 °F (36.8 °C) (Oral)   Resp 18   Ht 5' 9\" (1.753 m)   Wt 185 lb 13.6 oz (84.3 kg)   SpO2 93%   BMI 27.44 kg/m²       Intake/Output Summary (Last 24 hours) at 12/27/2019 1631  Last data filed at 12/27/2019 1457  Gross per 24 hour   Intake 770 ml   Output 2120 ml   Net -1350 ml       General: Alert and oriented. Resting in bed in no apparent distress. HEENT: Normocephalic. Atraumatic. Pupils equal and reactive. EOM intact. Oral mucosa pink/moist/intact. TM wnl, small amount of wax in the right ear, light reflex intact  Neck: Supple. Symmetrical. Trachea midline. Lungs: Clear to auscultation bilaterally. Respirations even and unlabored. Chest: Exam unremarkable. Cardiac: S1/S2 noted. Regular Rhythm and rate. Abdomen/GI: Soft. Non-tender. Non-distended. BS+. Extremities: PP+. Atraumatic. No redness/cyanosis/edema noted. Brisk cap refill. Skin: Dry and intact. No lesions noted. Neuro: Grossly intact. No focal deficits noted.  Cn 2-12 intact, NIH 1 ( facial droop), gait is ataxic     LABS:    Lab Results   Component Value Date    WBC 6.9 12/26/2019    HGB 11.6 (L) 12/26/2019    HCT 34.1 (L) 12/26/2019    .3 (H) 12/26/2019     12/26/2019    LYMPHOPCT 11.5 12/25/2019    RBC 3.33 (L) 12/26/2019    MCH 34.8 (H) 12/26/2019    MCHC 34.1 12/26/2019    RDW 12.5 12/26/2019       Lab Results   Component Value Date    CREATININE 1.1 12/27/2019    BUN 12 12/27/2019     (L) 12/27/2019    K 4.0 12/27/2019    CL 89 (L) 12/27/2019    CO2 27 12/27/2019       Lab Results   Component Value Date    MG 1.80 11/22/2016       Lab Results   Component Value Date    ALT 20 12/25/2019    AST 22 12/25/2019    ALKPHOS 52 12/25/2019    BILITOT 0.8 12/25/2019        No flowsheet data found. Imaging:  MRI brain without contrast   Final Result   Chronic microvascular disease without acute intracranial abnormality. XR ABDOMEN (KUB) (SINGLE AP VIEW)   Final Result   No significant findings in the abdomen. Spinal stimulator appears stable,   and another implanted device is observed in the right lower quadrant. CTA HEAD NECK W CONTRAST   Final Result   Addendum 1 of 1   ADDENDUM:   3D reconstructed images were performed on a separate workstation and    provided   for review. Final      CT Head WO Contrast   Final Result   No acute intracranial abnormality. Chronic small ischemic disease and age related involutional change. Abnormal soft tissue thickening identified involving both olfactory recesses. Please correlate with direct visualization when clinically feasible. XR CHEST STANDARD (2 VW)   Final Result   No acute process. Assessment & Plan:        Dizziness  - unclear etiology  -Initial concern for possible arrhythmia patient with event monitor, consulted cardiology and no acute arrhythmias noted on evaluation of this event monitor. -MRI neg  -On stroke pathway cont statin, on eliquis  - meclizine- doesn't seem to be helping  - pt for vestibular testing - no acute findings noted, trouble with testing d/t gait  - off ac for 3 days for procedure for skin removal of his nose  - continues with ataxia and left sided facial droop.   - PT/OT recommending continued therapy 5 to 7 days. Patient does not want to go to a facility and daughter states that she will take him home. She needs time to get her house ready and we do not have a safe plan for discharge at this time.   Will likely be DC'd in

## 2019-12-28 VITALS
DIASTOLIC BLOOD PRESSURE: 84 MMHG | HEART RATE: 72 BPM | TEMPERATURE: 97.7 F | SYSTOLIC BLOOD PRESSURE: 151 MMHG | WEIGHT: 187.83 LBS | HEIGHT: 69 IN | RESPIRATION RATE: 18 BRPM | BODY MASS INDEX: 27.82 KG/M2 | OXYGEN SATURATION: 94 %

## 2019-12-28 LAB
CORTISOL - AM: 19.3 UG/DL (ref 4.3–22.4)
CORTISOL TOTAL: 13.6 UG/DL
CORTISOL TOTAL: 23.9 UG/DL
CORTISOL TOTAL: 5.7 UG/DL

## 2019-12-28 PROCEDURE — 2580000003 HC RX 258: Performed by: INTERNAL MEDICINE

## 2019-12-28 PROCEDURE — G0378 HOSPITAL OBSERVATION PER HR: HCPCS

## 2019-12-28 PROCEDURE — 94760 N-INVAS EAR/PLS OXIMETRY 1: CPT

## 2019-12-28 PROCEDURE — 36415 COLL VENOUS BLD VENIPUNCTURE: CPT

## 2019-12-28 PROCEDURE — 82533 TOTAL CORTISOL: CPT

## 2019-12-28 PROCEDURE — 6370000000 HC RX 637 (ALT 250 FOR IP): Performed by: NURSE PRACTITIONER

## 2019-12-28 PROCEDURE — 6370000000 HC RX 637 (ALT 250 FOR IP): Performed by: INTERNAL MEDICINE

## 2019-12-28 PROCEDURE — 6360000002 HC RX W HCPCS: Performed by: NURSE PRACTITIONER

## 2019-12-28 RX ORDER — MECLIZINE HYDROCHLORIDE 25 MG/1
25 TABLET ORAL 3 TIMES DAILY
Qty: 30 TABLET | Refills: 0 | Status: SHIPPED | OUTPATIENT
Start: 2019-12-28 | End: 2020-01-07

## 2019-12-28 RX ADMIN — ATORVASTATIN CALCIUM 10 MG: 10 TABLET, FILM COATED ORAL at 08:41

## 2019-12-28 RX ADMIN — SODIUM CHLORIDE, PRESERVATIVE FREE 10 ML: 5 INJECTION INTRAVENOUS at 08:05

## 2019-12-28 RX ADMIN — APIXABAN 5 MG: 5 TABLET, FILM COATED ORAL at 08:41

## 2019-12-28 RX ADMIN — COSYNTROPIN 250 MCG: 0.25 INJECTION, POWDER, LYOPHILIZED, FOR SOLUTION INTRAMUSCULAR; INTRAVENOUS at 08:04

## 2019-12-28 RX ADMIN — MECLIZINE 25 MG: 12.5 TABLET ORAL at 08:41

## 2019-12-28 ASSESSMENT — PAIN SCALES - GENERAL: PAINLEVEL_OUTOF10: 0

## 2019-12-28 NOTE — DISCHARGE SUMMARY
Hospital Medicine Discharge Summary      Patient ID: Lexie Hurst      Patient's PCP: Jeny Louis Date: 12/25/2019     Discharge Date:   12/28/19     Admitting Physician: Soo Gan MD     Discharge Provider: AI Jones CNP     Consults: None    Discharge Diagnoses: Active Hospital Problems    Diagnosis Date Noted    Hyponatremia [E87.1] 12/26/2019    Dizziness [R42] 12/25/2019       Disposition:  [x] Home  [] Home with home health [] Rehab [] Psych [] SNF  [] LTAC  [] Long term nursing home or group home [] Transfer to ICU  [] Transfer to PCU [] Other:    [address] [facility] [address] [facility] [address]  [phone]    Schedule an appointment as soon as possible for a visit in 1 week  hospital follow up    Sharyle Reader, [address]  [address] [place] [address]  [phone]    Schedule an appointment as soon as possible for a visit  for hospital follow up       Discharge Condition: stable      Code Status:  Prior     Activity: activity as tolerated    Diet: No diet orders on file     Discharge Medications:     Discharge Medication List as of 12/28/2019 12:05 PM           Details   meclizine (ANTIVERT) 25 MG tablet Take 1 tablet by mouth 3 times daily for 10 days, Disp-30 tablet, R-0Normal              Details   oxyCODONE-acetaminophen (PERCOCET) 5-325 MG per tablet Take 1 tablet by mouth every 8 hours as needed for Pain. Historical Med      ELIQUIS 5 MG TABS tablet TAKE ONE TABLET BY MOUTH TWICE A DAY, Disp-60 tablet, R-4Normal      temazepam (RESTORIL) 30 MG capsule Take 30 mg by mouth nightly. Garvin Frankenmuth Historical Med      PARoxetine (PAXIL) 20 MG tablet Take 20 mg by mouth dailyHistorical Med      atorvastatin (LIPITOR) 10 MG tablet Take 1 tablet by mouth daily, Disp-30 tablet, R-5      vitamin D (ERGOCALCIFEROL) 50666 UNITS CAPS capsule Take 50,000 Units by mouth once a week             The patient was seen and examined on day of discharge and this with no blood pressure medication. Hydrochlorothiazide was stopped. Cortisol levels were performed and negative. Hydrochlorothiazide was thought to be related to hyponatremia. He was instructed to monitor his blood pressure closely at home and keep a log to follow-up with his primary care physician. He was also instructed to weigh himself and if he gains more than 3 pounds in 2 days he is instructed to call his cardiologist for further recommendations. He was evaluated by PT and OT who recommended skilled nursing facility and further therapy. Patient refused this. Family states that they would take him home and accommodate him until he can get back home. Walker and hospital bed were ordered. He was discharged in stable condition to follow-up as an outpatient. He verbalizes understanding and is in agreement with plan of care. Exam:     BP (!) 151/84   Pulse 72   Temp 97.7 °F (36.5 °C) (Oral)   Resp 18   Ht 5' 9\" (1.753 m)   Wt 187 lb 13.3 oz (85.2 kg)   SpO2 94%   BMI 27.74 kg/m²     General: Resting in bed in no apparent distress. Very pleasant  HEENT: Normocephalic. Atraumatic. Pupils equal and reactive. EOM intact. Oral mucosa pink/moist/intact. Neck: Supple. Symmetrical. Trachea midline. Lungs: Clear to auscultation bilaterally. Respirations even and unlabored. Chest: Exam unremarkable. Cardiac: S1/S2 noted. Regular Rhythm and rate. Abdomen/GI: Soft. Non-tender. Non-distended. BS+. Extremities: PP+. Atraumatic. No redness/cyanosis/edema noted. Brisk cap refill. Skin: Dry and intact. No lesions noted. Neuro: Grossly intact. No focal deficits noted. Left sided facial droop, gait not seen        Significant Diagnostic Studies:   ECHO, MRI brain, CTA, CT head      Labs:  For convenience and continuity at follow-up the following most recent labs are provided:    CBC:    Lab Results   Component Value Date    WBC 6.9 12/26/2019    HGB 11.6 12/26/2019    HCT 34.1 12/26/2019

## 2019-12-28 NOTE — PROGRESS NOTES
Iv and tele d/cd. Discharge instructions reviewed with pt and daughter, verbalized understanding. Denies any questions or other needs. Belongings gathered per pts daughter. Emir Jaeger provided for pt at discharge. Pt taken out via wheelchair by PCA and daughter to drive pt home to her house to stay.    Electronically signed by Frida Rich RN on 12/28/2019 at 12:44 PM

## 2019-12-28 NOTE — DISCHARGE INSTR - COC
Continuity of Care Form    Patient Name: Graham Peters   :  1940  MRN:  [id number]    Admit date:  2019  Discharge date:  19    Code Status Order: Full Code   Advance Directives:   [address] [facility] Documentation     Date/Time Healthcare Directive Type of Healthcare Directive Copy in [address] Agent's Name Healthcare Agent's Phone Number    [phone]  No, patient does not have an advance directive for healthcare treatment -- -- -- -- --          Admitting Physician:  Dayla Alpers, MD  PCP: Dalila Beck    Discharging Nurse: Dandy Lockett Unit/Room#: E0S-7737/5924-69  Discharging Unit Phone Number: [phone]    Emergency Contact:   Extended Emergency Contact Information  Primary Emergency Contact: Lyly Shah [address] Phone: [phone]  Mobile Phone: [phone]  Relation: Child  Secondary Emergency Contact: Mynor Artis [address] Phone: [phone]  Mobile Phone: [phone]  Relation: Child    Past Surgical History:  Past Surgical History:   Procedure Laterality Date    BACK SURGERY      COLONOSCOPY  2016    Dr Flora Ramirez         Immunization History: There is no immunization history on file for this patient.     Active Problems:  Patient Active Problem List   Diagnosis Code    [facility] I48.91    Paroxysmal atrial fibrillation (HCC) I48.0    Hypotension due to hypovolemia I95.89, E86.1    Dizziness R42    Hyponatremia E87.1       Isolation/Infection:   Isolation          No Isolation        Patient Infection Status     None to display          Nurse Assessment:  Last Vital Signs: BP (!) 157/87   Pulse 59   Temp 97.6 °F (36.4 °C) (Oral)   Resp 16   Ht 5' 9\" (1.753 m)   Wt 186 lb 1.1 oz (84.4 kg)   SpO2 96%   BMI 27.48 kg/m²     Last documented pain score (0-10 scale): Pain Level: 0  Last Weight:

## 2019-12-28 NOTE — PLAN OF CARE
Problem: Falls - Risk of:  Goal: Will remain free from falls  Description  Will remain free from falls  Outcome: Ongoing  Goal: Absence of physical injury  Description  Absence of physical injury  Outcome: Ongoing   Fall risk precautions in place. Bed in lowest position with wheels locked,bed alarm in place and activated,non-skid socks on pt, fall risk ID on pt, call light in reach    Problem: Safety:  Goal: Free from accidental physical injury  Description  Free from accidental physical injury  Outcome: Ongoing  Goal: Free from intentional harm  Description  Free from intentional harm  Outcome: Ongoing   Patient assessed for fall risk; fall precautions initiated. Patient and family instructed about safety devices. Environment kept free of clutter and adequate lighting provided. Bed locked and in lowest position. Call light within reach. Problem: Daily Care:  Goal: Daily care needs are met  Description  Daily care needs are met  Outcome: Ongoing   Patient's ability assessed to perform self care and independent activity encouraged according to that ability. Assisted with ADL's as needed. Risk for skin breakdown assessed. Potential discharge needs assessed. Patient and family included in daily care decisions. Problem: Pain:  Goal: Patient's pain/discomfort is manageable  Description  Patient's pain/discomfort is manageable  Outcome: Ongoing  Goal: Pain level will decrease  Description  Pain level will decrease  Outcome: Ongoing  Goal: Control of acute pain  Description  Control of acute pain  Outcome: Ongoing  Goal: Control of chronic pain  Description  Control of chronic pain  Outcome: Ongoing   Pain/discomfort being managed with PRN analgesics per MD orders. Pt able to express presence and absence of pain and rate pain appropriately using numerical scale.     Problem: ACTIVITY INTOLERANCE/IMPAIRED MOBILITY  Goal: Mobility/activity is maintained at optimum level for patient  Outcome: Ongoing

## 2020-02-03 ENCOUNTER — OFFICE VISIT (OUTPATIENT)
Dept: CARDIOLOGY CLINIC | Age: 80
End: 2020-02-03
Payer: MEDICARE

## 2020-02-03 VITALS
DIASTOLIC BLOOD PRESSURE: 80 MMHG | SYSTOLIC BLOOD PRESSURE: 136 MMHG | WEIGHT: 194 LBS | OXYGEN SATURATION: 98 % | BODY MASS INDEX: 28.73 KG/M2 | HEIGHT: 69 IN | HEART RATE: 75 BPM

## 2020-02-03 PROCEDURE — 99214 OFFICE O/P EST MOD 30 MIN: CPT | Performed by: INTERNAL MEDICINE

## 2020-02-03 PROCEDURE — 93000 ELECTROCARDIOGRAM COMPLETE: CPT | Performed by: INTERNAL MEDICINE

## 2020-02-03 PROCEDURE — 93228 REMOTE 30 DAY ECG REV/REPORT: CPT | Performed by: INTERNAL MEDICINE

## 2020-02-03 RX ORDER — LISINOPRIL 2.5 MG/1
TABLET ORAL
COMMUNITY
Start: 2007-02-06 | End: 2022-09-21

## 2020-02-03 NOTE — PROGRESS NOTES
[facility]   Cardiac Electrophysiology Consultation   Date: 2/3/2020  Reason for Consultation: Afib  Consult Requesting Physician: Karrie Vazquez MD  Primary Care Physician: Marce Bustos MD    Chief Complaint:   Chief Complaint   Patient presents with    Atrial Fibrillation     30 day event monitor results in chart        HPI: Ayad Hughes is a 78 y.o. with a PMH significant for HTN and paroxysmal afib (dating back to at least 2009) who is typically followed by Dr. Sonya aGleas for his cardiac needs. He was seen in office for initial consultation on 10/23/19 to discuss treatment options for his symptomatic paroxysmal Afib. He had been on Amiodarone since 2017 and was interested in perusing invasive therapy versus medical management. Atrial fibrillation  ablation was discuss and patient decided to proceed and subsequently underwent Afib ablation on 2/20/19. A 30 day event monitor was placed at his 3 months post ablation follow up and showed one episode of atrial flutter v-rate 130 bpm. Patient reports he was most likely exercising during this event. He subsequently underwent left atrial flutter ablation on 9/27/2019. Interval history: Today, patient present for follow up on the results of his 30 day monitor s/p left atrial flutter ablation on 9/27/2019, which showed predominately SR with 2 episodes of 2-3 beat atrial run and one episode of 12 beat NSVT. EKG today shows SR. He is compliant with his medications and tolerating them well. He  denies chest pain/pressure, tightness, edema, shortness of breath, heart racing, palpitations, lightheadedness, dizziness, syncope, presyncope,  PND or orthopnea.      Past Medical History:   Diagnosis Date    Atrial fibrillation ([address]     Back pain     Cancer ([address]     prostate    Hyperlipidemia     Hypertension         Past Surgical History:   Procedure Laterality Date    BACK SURGERY      COLONOSCOPY  12/30/2016    Dr Vin Andujar  TONSILLECTOMY         Allergies:  No Known Allergies    Medication:   Prior to Admission medications    Medication Sig Start Date End Date Taking? Authorizing Provider   lisinopril (PRINIVIL;ZESTRIL) 2.5 MG tablet LISINOPRIL TABS 2/6/07  Yes Historical Provider, MD   oxyCODONE-acetaminophen (PERCOCET) 5-325 MG per tablet Take 1 tablet by mouth every 8 hours as needed for Pain. Yes Historical Provider, MD   ELIQUIS 5 MG TABS tablet TAKE ONE TABLET BY MOUTH TWICE A DAY 8/14/19  Yes Alessandro Gibbs MD   temazepam (RESTORIL) 30 MG capsule Take 30 mg by mouth nightly. . 2/4/19  Yes Historical Provider, MD   atorvastatin (LIPITOR) 10 MG tablet Take 1 tablet by mouth daily 1/4/17  Yes Danis Castellon MD   vitamin D (ERGOCALCIFEROL) 74379 UNITS CAPS capsule Take 50,000 Units by mouth once a week   Yes Historical Provider, MD   PARoxetine (PAXIL) 20 MG tablet Take 20 mg by mouth daily 2/27/19 12/26/19  Historical Provider, MD       Social History:   reports that he quit smoking about 34 years ago. He quit after 12.00 years of use. He has never used smokeless tobacco. He reports that he does not drink alcohol or use drugs. Family History:  family history includes Colon Cancer in his brother. Reviewed.  Denies family history of sudden cardiac death, arrhythmia, premature CAD    Review of System:    · General ROS: negative for - chills, fever   · Psychological ROS: negative for - anxiety or depression  · Ophthalmic ROS: negative for - eye pain or loss of vision  · ENT ROS: negative for - epistaxis, headaches, nasal discharge, sore throat   · Allergy and Immunology ROS: negative for - hives, nasal congestion   · Hematological and Lymphatic ROS: negative for - bleeding problems, blood clots, bruising or jaundice  · Endocrine ROS: negative for - skin changes, temperature intolerance or unexpected weight changes  · Respiratory ROS: negative for - cough, hemoptysis, pleuritic pain, SOB, sputum changes or wheezing  · Cardiovascular ROS: Per HPI. · Gastrointestinal ROS: negative for - abdominal pain, blood in stools, diarrhea, hematemesis, melena,  nausea/vomiting or swallowing difficulty/pain  · Genito-Urinary ROS: negative for - dysuria or incontinence  · Musculoskeletal ROS: negative for - joint swelling or muscle pain  · Neurological ROS: negative for - confusion, dizziness, gait disturbance, headaches, numbness/tingling, seizures,  speech problems, tremors, visual changes or weakness  · Dermatological ROS: negative for - rash    Physical Examination:  Vitals:    20 1303   BP: 136/80   Pulse: 75   SpO2: 98%       · Constitutional: Oriented. No distress. · Head: Normocephalic and atraumatic. · Mouth/Throat: Oropharynx is clear and moist.   · Eyes: Conjunctivae normal. EOM are normal.   · Neck: Normal range of motion. Neck supple. No rigidity. No JVD present. · Cardiovascular: Regular rate, regular rhythm, S1&S2 and intact distal pulses. · Pulmonary/Chest: Bilateral respiratory sounds. No wheezes. No rhonchi. · Abdominal: Soft. Bowel sounds present. No distension, No tenderness. · Musculoskeletal: No tenderness. No edema    · Lymphadenopathy: Has no cervical adenopathy. · Neurological: Alert and oriented. Cranial nerve appears intact, No Gross deficit   · Skin: Skin is warm and dry. No rash noted. · Psychiatric: Has a normal mood, affect and behavior     Labs:  Reviewed. EC20 reviewed, sinus rhythm, occasional PAC with v-rate of 68 bpm with QRS duration 96 ms. No pathologic Q waves, ventricular pre-excitation, or QT prolongation. Studies:     1. 48 Hour Holter Monitor: 2014 ([facility])  Normal sinus rhythm with an excess of premature ventricular beats, some of which the  patient noted as irregular heart beat. There are several short runs of atrial  tachycardia for which the patient was asymptomatic. This is an abnormal Holter. Clinical correlation required.     2. 30 anti-arrhythmic medications.  -30 day event monitor to assess afib burden s/p left atrial flutter ablation on 9/27/2019 show SR, 2 episodes of 2-3 beat atrial run and one episode of 12 beat NSVT. EKG today show SR with PAC.  -Discussed s/s to monitor for (fatigue, dyspnea) and the need for a 12 lead EKG should these symptoms develop, to evaluate whether there is recurrence of atrial fibrillation or left atrial flutter. Follow up with EP services in three years time. Follow up with Dr. Tegan Dukes in 6 months. This note was scribed in the presence of Irina Cleaning MD by Lady Peng RN. The scribe's documentation has been prepared under my direction and personally reviewed by me in its entirety. I confirm that the note above accurately reflects all work, physical examination, the discussion of treatments and procedures, and medical decision making performed by me.       Irina Cleaning MD, [address], [facility], [facility]  Cardiac Electrophysiology  [address]    Office: [phone]

## 2020-03-09 RX ORDER — APIXABAN 5 MG/1
TABLET, FILM COATED ORAL
Qty: 60 TABLET | Refills: 3 | Status: SHIPPED | OUTPATIENT
Start: 2020-03-09 | End: 2020-07-13

## 2020-07-13 RX ORDER — APIXABAN 5 MG/1
TABLET, FILM COATED ORAL
Qty: 60 TABLET | Refills: 2 | Status: SHIPPED | OUTPATIENT
Start: 2020-07-13 | End: 2020-10-13

## 2020-08-10 NOTE — PROGRESS NOTES
[address]  Cardiology  Note      [facility]  1940, 78 y.o. Chief Complaint   Patient presents with    6 Month Follow-Up     vertigo                  LYSSA POWELLEN:      HPI:   This is a 78 y.o. male with a history of dCHF, HTN and AF. Previously seen at [facility], [facility] with c/o \"heart fluttering. \"  He was found to be in AFIB with RVR upon arrival to ED. Converted himself without intervention. Sent home on Xarelto. He later underwent Afib ablation on 19. Post ablation, atrial flutter was noted on event monitor and pt underwent Left atrial flutter ablation on 2019. Patient was later evaluated by Dr. Mya Melo 2019 during hospitalization. At that time, event monitor showed no atrial / ventricular arrhythmias. Comes for f/u of Afib. Has been well. Denies any difficulty breathing. Taking all medication with no adverse reactions. No abnormal bruising or bleeding noted. Denies any dyspnea, chest pain, palpitations and dizziness.             Past Medical History:   Diagnosis Date    Atrial fibrillation ([address]     Back pain     Cancer ([address]     prostate    Hyperlipidemia     Hypertension       Past Surgical History:   Procedure Laterality Date    ATRIAL ABLATION SURGERY Left 2019    BACK SURGERY      COLONOSCOPY  2016    Dr Mark Servin TONSILLECTOMY        Family History   Problem Relation Age of Onset    Colon Cancer Brother       Social History     Tobacco Use    Smoking status: Former Smoker     Years: 12.00     Last attempt to quit: 1986     Years since quittin.6    Smokeless tobacco: Never Used   Substance Use Topics    Alcohol use: No    Drug use: No     No Known Allergies      Review of Systems -   Constitutional: Negative for weight gain/loss; malaise, fever  Respiratory: Negative for Asthma;  cough and hemoptysis  Cardiovascular: Negative for palpitations,dizziness   Gastrointestinal: Negative for abd.pain; constipation/diarrhea;    Genitourinary: Negative for stones; hematuria; frequency hesitancy  Integumentt: Negative for rash or pruritis  Hematologic/lymphatic: Negative for blood dyscrasia; leukemia/lymphoma  Musculoskeletal: Negative for Connective tissue disease  Neurological:  Negative for Seizure   Behavioral/Psych:Negative for Bipolar disorder, Schizophrenia; Dementia  Endocrine: negative for thyroid, parathyroid disease    Physical Examination:    /72   Pulse 70   Temp 97.5 °F (36.4 °C)   Ht 5' 9\" (1.753 m)   Wt 198 lb 3.2 oz (89.9 kg) Comment: with shoes  SpO2 97%   BMI 29.27 kg/m²    HEENT:  Face: Atraumatic, Conjunctiva: Pink; non icteric,  Mucous Memb:  Moist, No thyromegaly or Lymphadenopathy  Respiratory:  Resp Assessment: normal, Resp Auscultation: clear  Cardiovascular: Auscultation: nl S1 & S2, Palpation:  Nl PMI; No heaves or thrills, JVP:  normal  Abdomen: Soft, non-tender, Normal bowel sounds,  No organomegaly  Extremities: No Cyanosis or Clubbing  Neurological: Oriented to time, place, and person, Non-anxious  Psychiatric: Normal mood and affect  Skin: Warm and dry,  No rash seen     Outpatient Medications Marked as Taking for the 20 encounter (Office Visit) with Will Cross MD   Medication Sig Dispense Refill    ELIQUIS 5 MG TABS tablet TAKE ONE TABLET BY MOUTH TWICE A DAY 60 tablet 2    lisinopril (PRINIVIL;ZESTRIL) 2.5 MG tablet LISINOPRIL TABS      oxyCODONE-acetaminophen (PERCOCET) 5-325 MG per tablet Take 1 tablet by mouth every 8 hours as needed for Pain.  temazepam (RESTORIL) 30 MG capsule Take 30 mg by mouth nightly. Kosta Stein PARoxetine (PAXIL) 20 MG tablet Take 20 mg by mouth daily      atorvastatin (LIPITOR) 10 MG tablet Take 1 tablet by mouth daily 30 tablet 5    vitamin D (ERGOCALCIFEROL) 06437 UNITS CAPS capsule Take 50,000 Units by mouth once a week         EK18 SR    EP procedures:  Afib ablation 19  Left atrial flutter ablation 9/27/19      ASSESSMENT AND PLAN:      Paroxysmal atrial fibrillation / atrial flutter   S/p Afib ablation 2/20/19 and left flutter ablation 9/27/19  Continue Eliquis for thromboembolic event reduction     Essential hypertension  Controlled  On ACE    Follow up in 1 year       Thank you very much for allowing me to participate in the care of your patient. Please do not hesitate to contact me if you have any questions. Sincerely,    Mariela Luis M.D  [facility], [address] [place] [address]  Ph: [phone]  Fax: [phone]    This note was scribed in the presence of Dr. Mariela Luis MD by Danis Leiva    Physician Attestation:  The scribes documentation has been prepared under my direction and personally reviewed by me in its entirety. I confirm that the note above accurately reflects all work, treatment, procedures, and medical decision making performed by me.

## 2020-08-12 ENCOUNTER — OFFICE VISIT (OUTPATIENT)
Dept: CARDIOLOGY CLINIC | Age: 80
End: 2020-08-12
Payer: MEDICARE

## 2020-08-12 VITALS
DIASTOLIC BLOOD PRESSURE: 72 MMHG | BODY MASS INDEX: 29.36 KG/M2 | HEIGHT: 69 IN | SYSTOLIC BLOOD PRESSURE: 131 MMHG | WEIGHT: 198.2 LBS | OXYGEN SATURATION: 97 % | HEART RATE: 70 BPM | TEMPERATURE: 97.5 F

## 2020-08-12 PROCEDURE — 99214 OFFICE O/P EST MOD 30 MIN: CPT | Performed by: INTERNAL MEDICINE

## 2020-08-12 PROCEDURE — 93000 ELECTROCARDIOGRAM COMPLETE: CPT | Performed by: INTERNAL MEDICINE

## 2020-10-13 RX ORDER — APIXABAN 5 MG/1
TABLET, FILM COATED ORAL
Qty: 60 TABLET | Refills: 2 | Status: SHIPPED | OUTPATIENT
Start: 2020-10-13 | End: 2021-01-12

## 2021-01-12 RX ORDER — APIXABAN 5 MG/1
TABLET, FILM COATED ORAL
Qty: 60 TABLET | Refills: 5 | Status: SHIPPED | OUTPATIENT
Start: 2021-01-12 | End: 2021-07-16

## 2021-07-16 RX ORDER — APIXABAN 5 MG/1
TABLET, FILM COATED ORAL
Qty: 180 TABLET | Refills: 3 | Status: SHIPPED | OUTPATIENT
Start: 2021-07-16

## 2021-08-10 NOTE — PROGRESS NOTES
[facility]  Cardiology  Note      Joseph Hood  1940, [de-identified] y.o. Chief Complaint   Patient presents with    1 Year Follow Up     no cc                 LYSSA DANIELS:      HPI:   This is a [de-identified] y.o. male with a history of dCHF, HTN and AF. Previously seen at [facility], [facility] with c/o \"heart fluttering. \"  He was found to be in AFIB with RVR upon arrival to ED. Converted himself without intervention. Sent home on Xarelto. He later underwent Afib ablation on 19. Post ablation, atrial flutter was noted on event monitor and pt underwent Left atrial flutter ablation on 2019. Patient was later evaluated by Dr. Gilles Thomas 2019 during hospitalization. At that time, event monitor showed no atrial / ventricular arrhythmias. Today, he is here for follow up for atrial fibrillation. He says that he is doing well. Patient denies exertional chest pain/pressure, dyspnea at rest, ARGUETA, PND, orthopnea, palpitations, lightheadedness, weight changes, changes in LE edema, and syncope. Patient reports compliance to his medications.      Past Medical History:   Diagnosis Date    Atrial fibrillation ([address]     Back pain     Cancer ([address]     prostate    Hyperlipidemia     Hypertension       Past Surgical History:   Procedure Laterality Date    ATRIAL ABLATION SURGERY Left 2019    BACK SURGERY      COLONOSCOPY  2016    Dr Citlalli Tiwari TONSILLECTOMY        Family History   Problem Relation Age of Onset    Colon Cancer Brother       Social History     Tobacco Use    Smoking status: Former Smoker     Years: 12.00     Quit date:      Years since quittin.6    Smokeless tobacco: Never Used   Vaping Use    Vaping Use: Never used   Substance Use Topics    Alcohol use: No    Drug use: No     No Known Allergies      Review of Systems -   Constitutional: Negative for weight gain/loss; malaise, fever  Respiratory: Negative for Asthma;  cough and hemoptysis  Cardiovascular: Negative for palpitations,dizziness   Gastrointestinal: Negative for abd.pain; constipation/diarrhea;    Genitourinary: Negative for stones; hematuria; frequency hesitancy  Integumentt: Negative for rash or pruritis  Hematologic/lymphatic: Negative for blood dyscrasia; leukemia/lymphoma  Musculoskeletal: Negative for Connective tissue disease  Neurological:  Negative for Seizure   Behavioral/Psych:Negative for Bipolar disorder, Schizophrenia; Dementia  Endocrine: negative for thyroid, parathyroid disease    Physical Examination:    /80   Pulse 79   Ht 5' 10\" (1.778 m)   Wt 190 lb 9.6 oz (86.5 kg)   SpO2 99%   BMI 27.35 kg/m²    HEENT:  Face: Atraumatic, Conjunctiva: Pink; non icteric,  Mucous Memb:  Moist, No thyromegaly or Lymphadenopathy  Respiratory:  Resp Assessment: normal, Resp Auscultation: clear  Cardiovascular: Auscultation: nl S1 & S2, Palpation:  Nl PMI; No heaves or thrills, JVP:  normal  Abdomen: Soft, non-tender, Normal bowel sounds,  No organomegaly  Extremities: No Cyanosis or Clubbing  Neurological: Oriented to time, place, and person, Non-anxious  Psychiatric: Normal mood and affect  Skin: Warm and dry,  No rash seen     Outpatient Medications Marked as Taking for the 21 encounter (Office Visit) with Estrella Hardin MD   Medication Sig Dispense Refill    ELIQUIS 5 MG TABS tablet TAKE ONE TABLET BY MOUTH TWICE A  tablet 3    lisinopril (PRINIVIL;ZESTRIL) 2.5 MG tablet LISINOPRIL TABS      oxyCODONE-acetaminophen (PERCOCET) 5-325 MG per tablet Take 1 tablet by mouth every 8 hours as needed for Pain.  temazepam (RESTORIL) 30 MG capsule Take 30 mg by mouth nightly. Charley Nagel PARoxetine (PAXIL) 20 MG tablet Take 20 mg by mouth daily      atorvastatin (LIPITOR) 10 MG tablet Take 1 tablet by mouth daily 30 tablet 5    vitamin D (ERGOCALCIFEROL) 34084 UNITS CAPS capsule Take 50,000 Units by mouth once a week         EK18 SR  21 Sinus rhythm     EP procedures:  Afib ablation 2/20/19  Left atrial flutter ablation 9/27/19      ASSESSMENT AND PLAN:      Paroxysmal atrial fibrillation / atrial flutter   S/p Afib ablation 2/20/19 and left flutter ablation 9/27/19  EKG today confirms regular rhythm today. Continue Eliquis for thromboembolic event reduction     Essential hypertension  Controlled  Continue current medical management. Follow up in 1 year    Thank you very much for allowing me to participate in the care of your patient. Please do not hesitate to contact me if you have any questions. Sincerely,    Suellen Pollock M.D  [facility], [facility] [address], [facility] [address]  Ph: [phone]  Fax: [phone]    This note was scribed in the presence of Dr Armand Lentz, by Dale Grissom RN  Physician Attestation:  The scribes documentation has been prepared under my direction and personally reviewed by me in its entirety. I confirm that the note above accurately reflects all work, treatment, procedures, and medical decision making performed by me.

## 2021-08-12 ENCOUNTER — OFFICE VISIT (OUTPATIENT)
Dept: CARDIOLOGY CLINIC | Age: 81
End: 2021-08-12
Payer: MEDICARE

## 2021-08-12 VITALS
HEIGHT: 70 IN | BODY MASS INDEX: 27.29 KG/M2 | WEIGHT: 190.6 LBS | HEART RATE: 79 BPM | SYSTOLIC BLOOD PRESSURE: 128 MMHG | DIASTOLIC BLOOD PRESSURE: 80 MMHG | OXYGEN SATURATION: 99 %

## 2021-08-12 DIAGNOSIS — I48.0 PAROXYSMAL ATRIAL FIBRILLATION (HCC): Primary | ICD-10-CM

## 2021-08-12 DIAGNOSIS — I10 ESSENTIAL HYPERTENSION: ICD-10-CM

## 2021-08-12 PROCEDURE — 99214 OFFICE O/P EST MOD 30 MIN: CPT | Performed by: INTERNAL MEDICINE

## 2021-08-12 PROCEDURE — 93000 ELECTROCARDIOGRAM COMPLETE: CPT | Performed by: INTERNAL MEDICINE

## 2021-08-12 NOTE — PATIENT INSTRUCTIONS

## 2022-02-14 ENCOUNTER — HOSPITAL ENCOUNTER (EMERGENCY)
Age: 82
Discharge: HOME | End: 2022-02-14
Attending: EMERGENCY MEDICINE
Payer: MEDICARE

## 2022-02-14 ENCOUNTER — APPOINTMENT (OUTPATIENT)
Dept: GENERAL RADIOLOGY | Age: 82
End: 2022-02-14
Payer: MEDICARE

## 2022-02-14 VITALS
WEIGHT: 192.24 LBS | RESPIRATION RATE: 16 BRPM | HEART RATE: 72 BPM | BODY MASS INDEX: 27.52 KG/M2 | DIASTOLIC BLOOD PRESSURE: 74 MMHG | OXYGEN SATURATION: 97 % | SYSTOLIC BLOOD PRESSURE: 155 MMHG | HEIGHT: 70 IN | TEMPERATURE: 98.1 F

## 2022-02-14 DIAGNOSIS — M19.011 OSTEOARTHRITIS OF RIGHT SHOULDER, UNSPECIFIED OSTEOARTHRITIS TYPE: ICD-10-CM

## 2022-02-14 DIAGNOSIS — M25.511 ACUTE PAIN OF RIGHT SHOULDER: Primary | ICD-10-CM

## 2022-02-14 PROCEDURE — 99283 EMERGENCY DEPT VISIT LOW MDM: CPT

## 2022-02-14 PROCEDURE — 73030 X-RAY EXAM OF SHOULDER: CPT

## 2022-02-14 PROCEDURE — 6370000000 HC RX 637 (ALT 250 FOR IP): Performed by: EMERGENCY MEDICINE

## 2022-02-14 PROCEDURE — 93005 ELECTROCARDIOGRAM TRACING: CPT | Performed by: EMERGENCY MEDICINE

## 2022-02-14 RX ORDER — LIDOCAINE 4 G/G
1 PATCH TOPICAL ONCE
Status: DISCONTINUED | OUTPATIENT
Start: 2022-02-14 | End: 2022-02-14 | Stop reason: HOSPADM

## 2022-02-14 RX ORDER — ACETAMINOPHEN 325 MG/1
650 TABLET ORAL ONCE
Status: COMPLETED | OUTPATIENT
Start: 2022-02-14 | End: 2022-02-14

## 2022-02-14 RX ORDER — IBUPROFEN 400 MG/1
400 TABLET ORAL ONCE
Status: DISCONTINUED | OUTPATIENT
Start: 2022-02-14 | End: 2022-02-14

## 2022-02-14 RX ORDER — IBUPROFEN 400 MG/1
400 TABLET ORAL ONCE
Status: COMPLETED | OUTPATIENT
Start: 2022-02-14 | End: 2022-02-14

## 2022-02-14 RX ORDER — ACETAMINOPHEN 325 MG/1
650 TABLET ORAL EVERY 8 HOURS PRN
Qty: 60 TABLET | Refills: 1 | Status: SHIPPED | OUTPATIENT
Start: 2022-02-14

## 2022-02-14 RX ADMIN — ACETAMINOPHEN 650 MG: 325 TABLET ORAL at 12:47

## 2022-02-14 RX ADMIN — IBUPROFEN 400 MG: 400 TABLET, FILM COATED ORAL at 12:47

## 2022-02-14 RX ADMIN — ACETAMINOPHEN 650 MG: 325 TABLET ORAL at 16:20

## 2022-02-14 ASSESSMENT — PAIN DESCRIPTION - DESCRIPTORS: DESCRIPTORS: ACHING

## 2022-02-14 ASSESSMENT — PAIN DESCRIPTION - PROGRESSION: CLINICAL_PROGRESSION: NOT CHANGED

## 2022-02-14 ASSESSMENT — PAIN DESCRIPTION - LOCATION: LOCATION: SHOULDER

## 2022-02-14 ASSESSMENT — PAIN SCALES - GENERAL
PAINLEVEL_OUTOF10: 9
PAINLEVEL_OUTOF10: 9

## 2022-02-14 ASSESSMENT — PAIN DESCRIPTION - ORIENTATION: ORIENTATION: RIGHT

## 2022-02-14 ASSESSMENT — PAIN DESCRIPTION - ONSET: ONSET: ON-GOING

## 2022-02-14 ASSESSMENT — PAIN DESCRIPTION - FREQUENCY: FREQUENCY: CONTINUOUS

## 2022-02-14 ASSESSMENT — PAIN DESCRIPTION - PAIN TYPE: TYPE: ACUTE PAIN

## 2022-02-14 ASSESSMENT — PAIN - FUNCTIONAL ASSESSMENT: PAIN_FUNCTIONAL_ASSESSMENT: PREVENTS OR INTERFERES SOME ACTIVE ACTIVITIES AND ADLS

## 2022-02-14 NOTE — ED PROVIDER NOTES
[address] [facility]      Pt Name: Melanie Avendano  MRN: [id number]  [place] 1940  Date of evaluation: 2/14/2022  Provider: Lenin Zhang MD    CHIEF COMPLAINT     I noticed last night when I try to get up out of my chair I could not use my right arm to push myself up which was weird. Then today I kept having a lot of pain when I tried to lift my arm or do anything with my arm  HISTORY OF PRESENT ILLNESS  (Location/Symptom, Timing/Onset,Context/Setting, Quality, Duration, Modifying Factors, Severity). Note limiting factors. Chief Complaint   Patient presents with    Shoulder Pain     Pt to ED with c/o right shoulder pain. Denies any fall or injury. States his shoulder was tender last night when he went to bed. This morning pain severe and difficult to move. Melanie Avendano is a 80 y.o. male who presents to the emergency department secondary to concern for right shoulder pain. No known trauma or falls. States he noticed it last night when he was trying to get up out of a chair from watching TV to go to bed. Rates pain as severe 9 out of 10. He took a Vicodin this morning for the shoulder pain which he states he usually takes Vicodin for his chronic back pain. He is on Eliquis for A. fib. He denies loss of strength in his hand or wrist but any kind of movement that requires him to lift the elbow away from his body. States this has never happened to him before. He denies any chest pain, trouble breathing, chest tightness. Past medical history noted below, significant for A. fib (on Eliquis), back pain, prostate cancer, hyperlipidemia, hypertension. Quit smoking over 10 years ago. Aside from what is stated above denies any other symptoms or modifying factors. Nursing Notes reviewed.     REVIEW OF SYSTEMS  (2-9 systems for level 4, 10 or more for level 5)   Review of Systems  Pertinent positive and negative findings as documented in the HPI;   PAST MEDICAL HISTORY     Past Medical History:   Diagnosis Date    [facility] fibrillation ([facility]     Back pain     Cancer ([facility]     prostate    Hyperlipidemia     Hypertension        SURGICALHISTORY       Past Surgical History:   Procedure Laterality Date    ATRIAL ABLATION SURGERY Left 2019    BACK SURGERY      COLONOSCOPY  2016    Dr Demi Khanna       Previous Medications    ATORVASTATIN (LIPITOR) 10 MG TABLET    Take 1 tablet by mouth daily    ELIQUIS 5 MG TABS TABLET    TAKE ONE TABLET BY MOUTH TWICE A DAY    LISINOPRIL (PRINIVIL;ZESTRIL) 2.5 MG TABLET    LISINOPRIL TABS    OXYCODONE-ACETAMINOPHEN (PERCOCET) 5-325 MG PER TABLET    Take 1 tablet by mouth every 8 hours as needed for Pain. PAROXETINE (PAXIL) 20 MG TABLET    Take 20 mg by mouth daily    TEMAZEPAM (RESTORIL) 30 MG CAPSULE    Take 30 mg by mouth nightly. Rometta Favre VITAMIN D (ERGOCALCIFEROL) 59234 UNITS CAPS CAPSULE    Take 50,000 Units by mouth once a week      ALLERGIES     Patient has no known allergies.   FAMILY HISTORY       Family History   Problem Relation Age of Onset    Colon Cancer Brother      SOCIAL HISTORY       Social History     Socioeconomic History    Marital status:      Spouse name: Not on file    Number of children: Not on file    Years of education: Not on file    Highest education level: Not on file   Occupational History    Not on file   Tobacco Use    Smoking status: Former Smoker     Years: 12.00     Quit date:      Years since quittin.1    Smokeless tobacco: Never Used   Vaping Use    Vaping Use: Never used   Substance and Sexual Activity    Alcohol use: No    Drug use: No    Sexual activity: Not Currently   Other Topics Concern    Not on file   Social History Narrative    ** Merged History Encounter **          Social Determinants of Health     Financial Resource Strain:     Difficulty of Paying Living Expenses: Not on file   Food Insecurity:     Worried About Running Out of Food in the Last Year: Not on file    Ran Out of Food in the Last Year: Not on file   Transportation Needs:     Lack of Transportation (Medical): Not on file    Lack of Transportation (Non-Medical): Not on file   Physical Activity:     Days of Exercise per Week: Not on file    Minutes of Exercise per Session: Not on file   Stress:     Feeling of Stress : Not on file   Social Connections:     Frequency of Communication with Friends and Family: Not on file    Frequency of Social Gatherings with Friends and Family: Not on file    Attends Zoroastrianism Services: Not on file    Active Member of [address] [place] [address] [employer] or Organizations: Not on file    Attends Club or Organization Meetings: Not on file    Marital Status: Not on file   Intimate Partner Violence:     Fear of Current or Ex-Partner: Not on file    Emotionally Abused: Not on file    Physically Abused: Not on file    Sexually Abused: Not on file   Housing Stability:     Unable to Pay for Housing in the Last Year: Not on file    Number of [place] in the Last Year: Not on file    Unstable Housing in the Last Year: Not on file     SCREENINGS         PHYSICAL EXAM  (up to 7 for level 4, 8 or more for level 5)   INITIAL VITALS: BP: (!) 156/75, Temp: 98.1 °F (36.7 °C), Pulse: 72, Resp: 16, SpO2: 97 %   Physical Exam  Vitals reviewed. Constitutional:       Appearance: He is not toxic-appearing or diaphoretic. HENT:      Head: Normocephalic and atraumatic. Right Ear: External ear normal.      Left Ear: External ear normal.   Eyes:      General:         Right eye: No discharge. Left eye: No discharge. Conjunctiva/sclera: Conjunctivae normal.   Neck:      Trachea: No tracheal deviation. Cardiovascular:      Rate and Rhythm: Normal rate.       Comments: Radial pulses 2+ and equal  Pulmonary:      Effort: Pulmonary effort is normal. No respiratory distress. Musculoskeletal:      Right shoulder: Tenderness and bony tenderness present. No swelling, deformity or crepitus. Decreased range of motion. Decreased strength. Normal pulse. Left shoulder: No tenderness or bony tenderness. Normal range of motion. Normal strength. Right upper arm: No swelling, deformity or tenderness. Right elbow: No swelling. Normal range of motion. No tenderness. Left elbow: Normal.      Right forearm: Normal.      Left forearm: Normal.      Right wrist: Normal.      Left wrist: Normal.      Right hand: Normal.      Left hand: Normal.        Arms:       Cervical back: Normal range of motion. No rigidity or tenderness. Right lower leg: No edema. Left lower leg: No edema. Comments: Difficulty with any type of abduction of the shoulder and internal rotation more than external rotation. Positive empty can test.  Endorses pain also with impingement motion. Lymphadenopathy:      Cervical: No cervical adenopathy. Skin:     General: Skin is dry. Capillary Refill: Capillary refill takes less than 2 seconds. Neurological:      General: No focal deficit present. Mental Status: He is alert and oriented to person, place, and time. Psychiatric:         Behavior: Behavior normal.       DIAGNOSTIC RESULTS   EKG: interpreted by the Emergency Department Physician who either signs or Co-signs this chart in the absence of a cardiologist.  Indication: Right shoulder  Interpretation: Rate 68, rhythm sinus, axis left. CA/QRS/QTc within normal limits. Nonspecific T wave abnormalities noted. Comparison to prior EKG from August 12, 2021 shows no acute ischemic change including nonspecific T wave abnormality. RADIOLOGY:   Interpretation per Radiologist below, if available at the time of this note:  XR SHOULDER RIGHT (MIN 2 VIEWS)   Final Result   No acute osseous abnormality of the right shoulder. Osteopenia with mild AC   degenerative change. LABS:  Labs Reviewed - No data to display    EMERGENCY DEPARTMENT COURSE and DIFFERENTIAL DIAGNOSIS/MDM:   Patient was given the following medications:  Orders Placed This Encounter   Medications    ibuprofen (ADVIL;MOTRIN) tablet 400 mg    acetaminophen (TYLENOL) tablet 650 mg    lidocaine 4 % external patch 1 patch    DISCONTD: ibuprofen (ADVIL;MOTRIN) tablet 400 mg    acetaminophen (TYLENOL) tablet 650 mg    acetaminophen (TYLENOL) 325 MG tablet     Sig: Take 2 tablets by mouth every 8 hours as needed for Pain or Fever     Dispense:  60 tablet     Refill:  1     CONSULTS:  None    INITIAL VITALS: BP: (!) 156/75, Temp: 98.1 °F (36.7 °C), Pulse: 72, Resp: 16, SpO2: 97 %   RECENT VITALS:  BP: (!) 156/75,Temp: 98.1 °F (36.7 °C), Pulse: 72, Resp: 16, SpO2: 97 %     Eleanor Jones is a 80 y.o. male who presents to the emergency department secondary to concern for symptoms as noted in HPI. On arrival he is awake, alert, oriented. Vitals notable for blood pressure 136/75 and otherwise hemodynamically stable and within normal limits. He does have a history of hypertension. His physical exam is notable for point tenderness at the acromion without any tenderness to palpation of his scapula, clavicle, biceps insertion. His PIN AIN and ulnar nerve are all intact. He has good  strength, wrist flexion extension and elbow extension. His elbow flexion on the right is minimally weaker than compared to the left and he has significant weakness and difficulty with any type of AB duction to the right shoulder. Regardless of the position being in front lateral or posterior. It does seem to be worse when he is internally rotated and lateral.    Given his age and his comorbidities I did order an EKG out of pure precaution given his pain is in the right shoulder. It is however very reproducible and seems most likely to be musculoskeletal in nature.   It is odd the pain started yesterday without any known trauma or falls, I did ask him specifically if he had any new activities or hobbies that he does deny. He states mostly he just watches a lot of TV and sports. EKG without signs of acute ischemic change. Shoulder XR show signs of mild degenerative change with no signs of acute fracture or bony pathology. On reassessment discussed results with patient. At that time he did report his pain had improved with the medication. He was able to move it much better. He stated however he was afraid to move it because what if that made it hurt more. We did talk about use of Tylenol and lidocaine patches along with following up with orthopedics and physical therapy. He states he has done physical therapy before and it \"never helps\" so he wants to see orthopedics first and declined getting a physical therapy referral today. We also talked about following up with his primary care and return precautions. I did also give him information on shoulder stretching. He expressed understanding of all instructions including return precautions and he was discharged home in stable condition. FINAL IMPRESSION      1. Acute pain of right shoulder    2. Osteoarthritis of right shoulder, unspecified osteoarthritis type        DISPOSITION/PLAN   DISPOSITION Discharge - Pending Orders Complete 02/14/2022 04:15:44 PM      PATIENT REFERRED TO:  Hernán Sullivan  [address] [place] [address].  [address] [place] [id number] [phone]    Schedule an appointment as soon as possible for a visit   For follow up appointment, As needed    Andrea Carter MD  [address] [facility].   [place] [address]    Schedule an appointment as soon as possible for a visit   For follow up appointment with orthopaedics      DISCHARGE MEDICATIONS:  New Prescriptions    ACETAMINOPHEN (TYLENOL) 325 MG TABLET    Take 2 tablets by mouth every 8 hours as needed for Pain or Fever            (Please note that portions of this note were completed with a voice

## 2022-02-15 LAB
EKG ATRIAL RATE: 68 BPM
EKG DIAGNOSIS: NORMAL
EKG P AXIS: 33 DEGREES
EKG P-R INTERVAL: 186 MS
EKG Q-T INTERVAL: 448 MS
EKG QRS DURATION: 98 MS
EKG QTC CALCULATION (BAZETT): 476 MS
EKG R AXIS: -30 DEGREES
EKG T AXIS: -18 DEGREES
EKG VENTRICULAR RATE: 68 BPM

## 2022-02-15 PROCEDURE — 93010 ELECTROCARDIOGRAM REPORT: CPT | Performed by: INTERNAL MEDICINE

## 2022-09-20 NOTE — PROGRESS NOTES
[facility]  Cardiology  Progress Note      Romero Isaac  1940, 80 y.o. Chief Complaint   Patient presents with    Annual Exam     Palpitations with overexertion                   LYSSA CARLOZ JEREMIAH:    CC: \"I have some palpitations. \"     HPI: This is a 80 y.o. male with a history of dCHF, HTN and AF. Previously seen at [facility], [facility] 16 with c/o \"heart fluttering. \"  He was found to be in AFIB with RVR upon arrival to ED. Converted himself without intervention. Sent home on Xarelto. He later underwent Afib ablation on 19. Post ablation, atrial flutter was noted on event monitor and pt underwent Left atrial flutter ablation on 2019. Patient was later evaluated by Dr. Quinten Betancourt 2019 during hospitalization. At that time, event monitor showed no atrial / ventricular arrhythmias. Today, he is here for follow up for atrial fibrillation. Palpitations with exertion otherwise denies any other cardiac sounding complaints. The patient denies exertional chest pain/pressure, dyspnea at rest, ARGUETA, PND, orthopnea, lightheadedness, weight changes, BLE edema, and syncope. He is not walking much due to leg length discrepancy. He also admits to medical therapy compliance, tolerating with no abnormal bruising or bleeding.      Past Medical History:   Diagnosis Date    Atrial fibrillation ([address]     Back pain     Cancer ([facility]     prostate    Hyperlipidemia     Hypertension       Past Surgical History:   Procedure Laterality Date    ATRIAL ABLATION SURGERY Left 2019    BACK SURGERY      COLONOSCOPY  2016    Dr Florentino Estrada        Family History   Problem Relation Age of Onset    Colon Cancer Brother       Social History     Tobacco Use    Smoking status: Former     Years: 12.00     Types: Cigarettes     Quit date:      Years since quittin.7    Smokeless tobacco: Never   Vaping Use    Vaping Use: Never used   Substance Use Topics    Alcohol use: No    Drug use: No     No Known Allergies      Review of Systems:  Constitutional: Negative for weight gain/loss; malaise, fever  Respiratory: Negative for Asthma;  cough and hemoptysis  Cardiovascular: Negative for palpitations,dizziness   Gastrointestinal: Negative for abd.pain; constipation/diarrhea;    Genitourinary: Negative for stones; hematuria; frequency hesitancy  Integumentt: Negative for rash or pruritis  Hematologic/lymphatic: Negative for blood dyscrasia; leukemia/lymphoma  Musculoskeletal: Negative for Connective tissue disease  Neurological:  Negative for Seizure   Behavioral/Psych:Negative for Bipolar disorder, Schizophrenia; Dementia  Endocrine: negative for thyroid, parathyroid disease    Physical Examination:    /74   Pulse 76   Ht 5' 8\" (1.727 m)   Wt 192 lb (87.1 kg)   SpO2 97%   BMI 29.19 kg/m²    HEENT:  Face: Atraumatic, Conjunctiva: Pink; non icteric,  Mucous Memb:  Moist, No thyromegaly or Lymphadenopathy  Respiratory:  Resp Assessment: normal, Resp Auscultation: clear  Cardiovascular: Auscultation: nl S1 & S2, Palpation:  Nl PMI; No heaves or thrills, JVP:  normal  Abdomen: Soft, non-tender, Normal bowel sounds,  No organomegaly  Extremities: No Cyanosis or Clubbing  Neurological: Oriented to time, place, and person, Non-anxious  Psychiatric: Normal mood and affect  Skin: Warm and dry,  No rash seen     Outpatient Medications Marked as Taking for the 9/21/22 encounter (Office Visit) with Elkin Cade MD   Medication Sig Dispense Refill    acetaminophen (TYLENOL) 325 MG tablet Take 2 tablets by mouth every 8 hours as needed for Pain or Fever 60 tablet 1    ELIQUIS 5 MG TABS tablet TAKE ONE TABLET BY MOUTH TWICE A  tablet 3    lisinopril (PRINIVIL;ZESTRIL) 2.5 MG tablet LISINOPRIL TABS      oxyCODONE-acetaminophen (PERCOCET) 5-325 MG per tablet Take 1 tablet by mouth every 8 hours as needed for Pain.      temazepam (RESTORIL) 30 MG capsule Take 30 mg by mouth nightly. Clif Alex PARoxetine (PAXIL) 20 MG tablet Take 20 mg by mouth daily      atorvastatin (LIPITOR) 10 MG tablet Take 1 tablet by mouth daily 30 tablet 5    vitamin D (ERGOCALCIFEROL) 90599 UNITS CAPS capsule Take 50,000 Units by mouth once a week         EK18 SR  21 Sinus rhythm  22:  Sinus  Rhythm -  Nonspecific T-abnormality. ~75 bpm     EP procedures:  Afib ablation 19  Left atrial flutter ablation 19      ASSESSMENT AND PLAN:      Paroxysmal atrial fibrillation / atrial flutter   S/p Afib ablation 19 and left flutter ablation 19  EKG today confirms regular rhythm today. Reports occasional palpitations with exertion. Continue Eliquis for thromboembolic event reduction   22 H/H 36.5    Essential hypertension  Would like to increase his Lisinopril from 2.5 mg daily to 10 mg daily. Continue current medical management. Normal BMP 22 TCH     Follow up in 6 months. Thank you very much for allowing me to participate in the care of your patient. Please do not hesitate to contact me if you have any questions. Sincerely,    Lynsey Powell M.D  [facility], [address] [place] [address]  Ph: [phone]  Fax: [phone]    This note was scribed in the presence of Won Muhammad MD by Vickie Velásquez RN. Physician Attestation:  The scribes documentation has been prepared under my direction and personally reviewed by me in its entirety. I confirm that the note above accurately reflects all work, treatment, procedures, and medical decision making performed by me.

## 2022-09-21 ENCOUNTER — OFFICE VISIT (OUTPATIENT)
Dept: CARDIOLOGY CLINIC | Age: 82
End: 2022-09-21
Payer: MEDICARE

## 2022-09-21 VITALS
HEIGHT: 68 IN | DIASTOLIC BLOOD PRESSURE: 74 MMHG | SYSTOLIC BLOOD PRESSURE: 136 MMHG | HEART RATE: 76 BPM | OXYGEN SATURATION: 97 % | WEIGHT: 192 LBS | BODY MASS INDEX: 29.1 KG/M2

## 2022-09-21 DIAGNOSIS — I10 ESSENTIAL HYPERTENSION: ICD-10-CM

## 2022-09-21 DIAGNOSIS — I48.0 PAROXYSMAL ATRIAL FIBRILLATION (HCC): Primary | ICD-10-CM

## 2022-09-21 DIAGNOSIS — I48.92 ATRIAL FLUTTER, UNSPECIFIED TYPE (HCC): ICD-10-CM

## 2022-09-21 PROCEDURE — 93000 ELECTROCARDIOGRAM COMPLETE: CPT | Performed by: INTERNAL MEDICINE

## 2022-09-21 PROCEDURE — 99214 OFFICE O/P EST MOD 30 MIN: CPT | Performed by: INTERNAL MEDICINE

## 2022-09-21 PROCEDURE — 1123F ACP DISCUSS/DSCN MKR DOCD: CPT | Performed by: INTERNAL MEDICINE

## 2022-09-21 RX ORDER — LISINOPRIL 10 MG/1
10 TABLET ORAL DAILY
Qty: 30 TABLET | Refills: 11 | Status: SHIPPED | OUTPATIENT
Start: 2022-09-21

## 2023-03-24 ENCOUNTER — OFFICE VISIT (OUTPATIENT)
Dept: CARDIOLOGY CLINIC | Age: 83
End: 2023-03-24
Payer: MEDICARE

## 2023-03-24 VITALS
OXYGEN SATURATION: 98 % | WEIGHT: 193 LBS | BODY MASS INDEX: 29.25 KG/M2 | HEIGHT: 68 IN | DIASTOLIC BLOOD PRESSURE: 74 MMHG | HEART RATE: 74 BPM | SYSTOLIC BLOOD PRESSURE: 148 MMHG

## 2023-03-24 DIAGNOSIS — I48.0 PAROXYSMAL ATRIAL FIBRILLATION (HCC): Primary | ICD-10-CM

## 2023-03-24 DIAGNOSIS — I10 ESSENTIAL HYPERTENSION: ICD-10-CM

## 2023-03-24 DIAGNOSIS — I48.92 ATRIAL FLUTTER, UNSPECIFIED TYPE (HCC): ICD-10-CM

## 2023-03-24 PROCEDURE — 99214 OFFICE O/P EST MOD 30 MIN: CPT | Performed by: INTERNAL MEDICINE

## 2023-03-24 PROCEDURE — G8484 FLU IMMUNIZE NO ADMIN: HCPCS | Performed by: INTERNAL MEDICINE

## 2023-03-24 PROCEDURE — 1036F TOBACCO NON-USER: CPT | Performed by: INTERNAL MEDICINE

## 2023-03-24 PROCEDURE — 93000 ELECTROCARDIOGRAM COMPLETE: CPT | Performed by: INTERNAL MEDICINE

## 2023-03-24 PROCEDURE — 3078F DIAST BP <80 MM HG: CPT | Performed by: INTERNAL MEDICINE

## 2023-03-24 PROCEDURE — G8427 DOCREV CUR MEDS BY ELIG CLIN: HCPCS | Performed by: INTERNAL MEDICINE

## 2023-03-24 PROCEDURE — 3077F SYST BP >= 140 MM HG: CPT | Performed by: INTERNAL MEDICINE

## 2023-03-24 PROCEDURE — G8417 CALC BMI ABV UP PARAM F/U: HCPCS | Performed by: INTERNAL MEDICINE

## 2023-03-24 PROCEDURE — 1123F ACP DISCUSS/DSCN MKR DOCD: CPT | Performed by: INTERNAL MEDICINE

## 2023-03-24 RX ORDER — LISINOPRIL 20 MG/1
20 TABLET ORAL DAILY
Qty: 90 TABLET | OUTPATIENT
Start: 2023-03-24

## 2023-03-24 RX ORDER — LISINOPRIL 20 MG/1
20 TABLET ORAL DAILY
Qty: 30 TABLET | Refills: 11 | Status: SHIPPED | OUTPATIENT
Start: 2023-03-24

## 2023-03-24 NOTE — PROGRESS NOTES
of your patient. Please do not hesitate to contact me if you have any questions. Sincerely,    Constantino Butler M.D  [facility], [address] HospitalChris [place] [address]  Ph: [phone]  Fax: [phone]    This note was scribed in the presence of Dr. Constantino Butler MD by Carlos Alberto Howard RN  Physician Attestation:  The scribes documentation has been prepared under my direction and personally reviewed by me in its entirety. I confirm that the note above accurately reflects all work, treatment, procedures, and medical decision making performed by me. I have personally reviewed all previous testing for this visit today including imaging, lab results and EKG as detailed above and in care everywhere.

## 2024-03-21 NOTE — PROGRESS NOTES
[facility]  Cardiology Note      Percy Awan  1940, 83 y.o.      CC: Annual Follow up, CHF, HTN  Luis Antonio Wang P:        HPI:   Mr. Awan is an 82-year-old gentleman with a history of atrial fibrillation and flutter.  He had presented with palpitations and underwent sequential fib and flutter ablation.  He is presently taking anticoagulation but is not on any antiarrhythmic drugs.  He complains about palpitations with spells last for less than a minute.    Patient feels well with the exception of a \"bad ankle and bad hip.\" This causes him difficulty walking. He doesn't participate in much physical activity. Sold his condo and now lives with his son. Uses a lift to get up the stairs. Has no new cardiac complaints. Reports compliance with medication.             Past Medical History:   Diagnosis Date    Atrial fibrillation (HCC)     Back pain     Cancer (HCC)     prostate    Hyperlipidemia     Hypertension       Past Surgical History:   Procedure Laterality Date    ATRIAL ABLATION SURGERY Left 2019    BACK SURGERY      COLONOSCOPY  2016    Dr Antunez    PROSTATE SURGERY      PROSTATECTOMY      TONSILLECTOMY        Family History   Problem Relation Age of Onset    Colon Cancer Brother       Social History     Tobacco Use    Smoking status: Former     Current packs/day: 0.00     Types: Cigarettes     Start date:      Quit date:      Years since quittin.2    Smokeless tobacco: Never   Vaping Use    Vaping Use: Never used   Substance Use Topics    Alcohol use: No    Drug use: No     No Known Allergies      Review of Systems:  Constitutional: Negative for weight gain/loss; malaise, fever  Respiratory: Negative for Asthma;  cough and hemoptysis  Cardiovascular: Negative for palpitations,dizziness   Gastrointestinal: Negative for abd.pain; constipation/diarrhea;    Genitourinary: Negative for stones; hematuria; frequency hesitancy  Integumentt: Negative for rash or

## 2024-03-22 ENCOUNTER — OFFICE VISIT (OUTPATIENT)
Dept: CARDIOLOGY CLINIC | Age: 84
End: 2024-03-22
Payer: MEDICARE

## 2024-03-22 VITALS
SYSTOLIC BLOOD PRESSURE: 148 MMHG | OXYGEN SATURATION: 96 % | WEIGHT: 196 LBS | BODY MASS INDEX: 29.7 KG/M2 | HEART RATE: 64 BPM | HEIGHT: 68 IN | DIASTOLIC BLOOD PRESSURE: 80 MMHG

## 2024-03-22 DIAGNOSIS — I48.0 PAROXYSMAL ATRIAL FIBRILLATION (HCC): Primary | ICD-10-CM

## 2024-03-22 PROCEDURE — G8417 CALC BMI ABV UP PARAM F/U: HCPCS | Performed by: INTERNAL MEDICINE

## 2024-03-22 PROCEDURE — 1036F TOBACCO NON-USER: CPT | Performed by: INTERNAL MEDICINE

## 2024-03-22 PROCEDURE — 93000 ELECTROCARDIOGRAM COMPLETE: CPT | Performed by: INTERNAL MEDICINE

## 2024-03-22 PROCEDURE — G8427 DOCREV CUR MEDS BY ELIG CLIN: HCPCS | Performed by: INTERNAL MEDICINE

## 2024-03-22 PROCEDURE — G8484 FLU IMMUNIZE NO ADMIN: HCPCS | Performed by: INTERNAL MEDICINE

## 2024-03-22 PROCEDURE — 1123F ACP DISCUSS/DSCN MKR DOCD: CPT | Performed by: INTERNAL MEDICINE

## 2024-03-22 PROCEDURE — 99214 OFFICE O/P EST MOD 30 MIN: CPT | Performed by: INTERNAL MEDICINE

## 2024-03-22 RX ORDER — LISINOPRIL AND HYDROCHLOROTHIAZIDE 25; 20 MG/1; MG/1
1 TABLET ORAL DAILY
Qty: 90 TABLET | Refills: 5 | Status: SHIPPED | OUTPATIENT
Start: 2024-03-22

## 2024-07-16 ENCOUNTER — APPOINTMENT (OUTPATIENT)
Dept: GENERAL RADIOLOGY | Age: 84
End: 2024-07-16
Payer: MEDICARE

## 2024-07-16 ENCOUNTER — HOSPITAL ENCOUNTER (EMERGENCY)
Age: 84
Discharge: HOME | End: 2024-07-16
Attending: EMERGENCY MEDICINE
Payer: MEDICARE

## 2024-07-16 VITALS
TEMPERATURE: 97.7 F | HEART RATE: 82 BPM | WEIGHT: 197.09 LBS | DIASTOLIC BLOOD PRESSURE: 74 MMHG | RESPIRATION RATE: 17 BRPM | OXYGEN SATURATION: 97 % | BODY MASS INDEX: 29.87 KG/M2 | HEIGHT: 68 IN | SYSTOLIC BLOOD PRESSURE: 127 MMHG

## 2024-07-16 DIAGNOSIS — J40 BRONCHITIS: Primary | ICD-10-CM

## 2024-07-16 DIAGNOSIS — R05.1 ACUTE COUGH: ICD-10-CM

## 2024-07-16 PROCEDURE — 71046 X-RAY EXAM CHEST 2 VIEWS: CPT

## 2024-07-16 PROCEDURE — 99283 EMERGENCY DEPT VISIT LOW MDM: CPT

## 2024-07-16 RX ORDER — BENZONATATE 200 MG/1
200 CAPSULE ORAL 3 TIMES DAILY PRN
Qty: 21 CAPSULE | Refills: 0 | Status: SHIPPED | OUTPATIENT
Start: 2024-07-16 | End: 2024-07-23

## 2024-07-16 ASSESSMENT — PAIN - FUNCTIONAL ASSESSMENT: PAIN_FUNCTIONAL_ASSESSMENT: NONE - DENIES PAIN

## 2024-07-16 NOTE — ED PROVIDER NOTES
eMERGENCY dEPARTMENT eNCOUnter      Pt Name: Percy Awan  MRN: [id number]  Birthdate 1940  Date of evaluation: 7/16/2024  Provider: BAN MACIAS MD     CHIEF COMPLAINT       Chief Complaint   Patient presents with    Cough     Reports productive cough x  dys         HISTORY OF PRESENT ILLNESS   (Location/Symptom, Timing/Onset,Context/Setting, Quality, Duration, Modifying Factors, Severity) Note limiting factors.   HPI    Percy Awan is a 83 y.o. male who presents to the emergency department with 3 days history of a cough occasional productive.  Otherwise is fairly dry.  There has been no fevers no chills.  Patient states there is no energy.  Patient denies any chest pain.  No shortness of breath.  Patient has no contact with COVID.  No concern for COVID.  Patient has no abdominal pain.  No nausea no vomiting.  Patient also denies any sore throat.  Patient has been using over-the-counter medication without relief.    Nursing Notes were reviewed.    REVIEW OFSYSTEMS    (2+ for level 4; 10+ for level 5)   Review of Systems    General: No fevers, chills or night sweats, No weight loss    Head:  No Sore throat,  No Ear Pain    Chest:  Nontender.  Positive cough which is fairly nonproductive., No SOB,  Chest Pain    GI: No abdominal pain or vomiting    : No dysuria or hematuria    Musculoskeletal: No unrelenting pain or night pain    Neurologic: No bowel or bladder incontinence, No saddle anesthesia, No leg weakness    All other systems reviewed and are negative.        PAST MEDICAL HISTORY     Past Medical History:   Diagnosis Date    Atrial fibrillation (HCC)     Back pain     Cancer (HCC)     prostate    Hyperlipidemia     Hypertension        SURGICAL HISTORY       Past Surgical History:   Procedure Laterality Date    ATRIAL ABLATION SURGERY Left 09/27/2019    BACK SURGERY      COLONOSCOPY  12/30/2016    Dr Antunez    PROSTATE SURGERY      PROSTATECTOMY      TONSILLECTOMY         CURRENT MEDICATIONS

## 2024-07-16 NOTE — DISCHARGE INSTRUCTIONS
Take cough medication as directed.  Follow-up with family doctor return to the ED if you are feeling worse.  Or you developing a high fever or chills or chest pain.

## 2025-03-24 ENCOUNTER — OFFICE VISIT (OUTPATIENT)
Dept: CARDIOLOGY CLINIC | Age: 85
End: 2025-03-24
Payer: MEDICARE

## 2025-03-24 VITALS
DIASTOLIC BLOOD PRESSURE: 80 MMHG | HEART RATE: 69 BPM | WEIGHT: 191.4 LBS | BODY MASS INDEX: 29.1 KG/M2 | OXYGEN SATURATION: 99 % | SYSTOLIC BLOOD PRESSURE: 120 MMHG

## 2025-03-24 DIAGNOSIS — Z86.79 STATUS POST ABLATION OF ATRIAL FIBRILLATION: ICD-10-CM

## 2025-03-24 DIAGNOSIS — Z98.890 STATUS POST ABLATION OF ATRIAL FIBRILLATION: ICD-10-CM

## 2025-03-24 DIAGNOSIS — I10 HYPERTENSION, UNSPECIFIED TYPE: Primary | ICD-10-CM

## 2025-03-24 PROCEDURE — 1123F ACP DISCUSS/DSCN MKR DOCD: CPT | Performed by: INTERNAL MEDICINE

## 2025-03-24 PROCEDURE — 1159F MED LIST DOCD IN RCRD: CPT | Performed by: INTERNAL MEDICINE

## 2025-03-24 PROCEDURE — G8419 CALC BMI OUT NRM PARAM NOF/U: HCPCS | Performed by: INTERNAL MEDICINE

## 2025-03-24 PROCEDURE — 1036F TOBACCO NON-USER: CPT | Performed by: INTERNAL MEDICINE

## 2025-03-24 PROCEDURE — G2211 COMPLEX E/M VISIT ADD ON: HCPCS | Performed by: INTERNAL MEDICINE

## 2025-03-24 PROCEDURE — 3079F DIAST BP 80-89 MM HG: CPT | Performed by: INTERNAL MEDICINE

## 2025-03-24 PROCEDURE — G8427 DOCREV CUR MEDS BY ELIG CLIN: HCPCS | Performed by: INTERNAL MEDICINE

## 2025-03-24 PROCEDURE — 99214 OFFICE O/P EST MOD 30 MIN: CPT | Performed by: INTERNAL MEDICINE

## 2025-03-24 PROCEDURE — 93000 ELECTROCARDIOGRAM COMPLETE: CPT | Performed by: INTERNAL MEDICINE

## 2025-03-24 PROCEDURE — 3074F SYST BP LT 130 MM HG: CPT | Performed by: INTERNAL MEDICINE

## 2025-03-24 NOTE — PROGRESS NOTES
[facility]  Cardiology Note      Percy Awan  1940, 84 y.o.      CC: Annual Follow up atrial fibrillation   Luis Antonio Wang MD:    Problem list:   Atrial fibrillation/flutter  Hypertension  Hyperlipidemia     HPI: Percy Awan is a 84 y.o. patient with a past medical history significant for atrial fibrillation and flutter., hypertension, and hyperlipidemia.  He had presented with palpitations and underwent sequential fib and flutter ablation.  He is presently taking anticoagulation but is not on any antiarrhythmic drugs.  He complained about palpitations with spells last for less than a minute.    Today, he presents in office for a follow up.  Doing well.  Denies any chest pain shortness of breath palpitations or dizziness.  Compliant with his medications            Past Medical History:   Diagnosis Date    Atrial fibrillation (HCC)     Back pain     Cancer (HCC)     prostate    Hyperlipidemia     Hypertension       Past Surgical History:   Procedure Laterality Date    ATRIAL ABLATION SURGERY Left 2019    BACK SURGERY      COLONOSCOPY  2016    Dr Antunez    PROSTATE SURGERY      PROSTATECTOMY      TONSILLECTOMY        Family History   Problem Relation Age of Onset    Colon Cancer Brother       Social History     Tobacco Use    Smoking status: Former     Current packs/day: 0.00     Types: Cigarettes     Start date:      Quit date:      Years since quittin.2    Smokeless tobacco: Never   Vaping Use    Vaping status: Never Used   Substance Use Topics    Alcohol use: No    Drug use: No     No Known Allergies      Review of Systems:  Constitutional: Negative for weight gain/loss; malaise, fever  Respiratory: Negative for Asthma;  cough and hemoptysis  Cardiovascular: Negative for palpitations,dizziness   Gastrointestinal: Negative for abd.pain; constipation/diarrhea;    Genitourinary: Negative for stones; hematuria; frequency hesitancy  Integumentt: Negative for rash or